# Patient Record
Sex: FEMALE | Race: OTHER | Employment: FULL TIME | URBAN - METROPOLITAN AREA
[De-identification: names, ages, dates, MRNs, and addresses within clinical notes are randomized per-mention and may not be internally consistent; named-entity substitution may affect disease eponyms.]

---

## 2018-01-02 PROCEDURE — 87591 N.GONORRHOEAE DNA AMP PROB: CPT | Performed by: NURSE PRACTITIONER

## 2018-01-02 PROCEDURE — 87491 CHLMYD TRACH DNA AMP PROBE: CPT | Performed by: NURSE PRACTITIONER

## 2018-01-02 PROCEDURE — 88175 CYTOPATH C/V AUTO FLUID REDO: CPT | Performed by: NURSE PRACTITIONER

## 2018-01-02 PROCEDURE — 87624 HPV HI-RISK TYP POOLED RSLT: CPT | Performed by: NURSE PRACTITIONER

## 2019-12-06 ENCOUNTER — HOSPITAL ENCOUNTER (EMERGENCY)
Facility: HOSPITAL | Age: 29
Discharge: HOME | End: 2019-12-07
Attending: EMERGENCY MEDICINE
Payer: COMMERCIAL

## 2019-12-06 DIAGNOSIS — O20.0 THREATENED ABORTION: Primary | ICD-10-CM

## 2019-12-06 PROCEDURE — 99284 EMERGENCY DEPT VISIT MOD MDM: CPT

## 2019-12-06 PROCEDURE — 99285 EMERGENCY DEPT VISIT HI MDM: CPT

## 2019-12-06 PROCEDURE — 96360 HYDRATION IV INFUSION INIT: CPT

## 2019-12-06 RX ORDER — SERTRALINE HYDROCHLORIDE 100 MG/1
100 TABLET, FILM COATED ORAL DAILY
COMMUNITY
End: 2020-03-18 | Stop reason: ALTCHOICE

## 2019-12-06 RX ORDER — PRENATAL VIT/IRON FUM/FOLIC AC 27 MG-1 MG
TABLET ORAL
COMMUNITY

## 2019-12-06 RX ORDER — ASPIRIN 81 MG/1
81 TABLET ORAL DAILY
COMMUNITY
End: 2019-12-18

## 2019-12-06 RX ORDER — PROGESTERONE 50 MG/ML
INJECTION, SOLUTION INTRAMUSCULAR DAILY
COMMUNITY
End: 2019-12-18

## 2019-12-06 RX ORDER — ESTRADIOL 2 MG/1
6 TABLET ORAL NIGHTLY
COMMUNITY
End: 2019-12-18

## 2019-12-07 ENCOUNTER — APPOINTMENT (OUTPATIENT)
Dept: ULTRASOUND IMAGING | Facility: HOSPITAL | Age: 29
End: 2019-12-07
Attending: EMERGENCY MEDICINE
Payer: COMMERCIAL

## 2019-12-07 VITALS
TEMPERATURE: 97 F | HEART RATE: 71 BPM | RESPIRATION RATE: 20 BRPM | DIASTOLIC BLOOD PRESSURE: 67 MMHG | HEIGHT: 66 IN | WEIGHT: 160 LBS | OXYGEN SATURATION: 98 % | BODY MASS INDEX: 25.71 KG/M2 | SYSTOLIC BLOOD PRESSURE: 109 MMHG

## 2019-12-07 PROCEDURE — 85025 COMPLETE CBC W/AUTO DIFF WBC: CPT | Performed by: EMERGENCY MEDICINE

## 2019-12-07 PROCEDURE — 76802 OB US < 14 WKS ADDL FETUS: CPT | Performed by: EMERGENCY MEDICINE

## 2019-12-07 PROCEDURE — 93975 VASCULAR STUDY: CPT | Performed by: EMERGENCY MEDICINE

## 2019-12-07 PROCEDURE — 76801 OB US < 14 WKS SINGLE FETUS: CPT | Performed by: EMERGENCY MEDICINE

## 2019-12-07 PROCEDURE — 84702 CHORIONIC GONADOTROPIN TEST: CPT | Performed by: EMERGENCY MEDICINE

## 2019-12-07 PROCEDURE — 86901 BLOOD TYPING SEROLOGIC RH(D): CPT | Performed by: EMERGENCY MEDICINE

## 2019-12-07 PROCEDURE — 76817 TRANSVAGINAL US OBSTETRIC: CPT | Performed by: EMERGENCY MEDICINE

## 2019-12-07 PROCEDURE — 86900 BLOOD TYPING SEROLOGIC ABO: CPT | Performed by: EMERGENCY MEDICINE

## 2019-12-07 NOTE — ED INITIAL ASSESSMENT (HPI)
29 yo F, , 8 weeks pregnant, IVF twins. Started vaginal bleeding, with blood clot, mucus coming out, and some mild abdomen cramping.

## 2019-12-07 NOTE — ED PROVIDER NOTES
Patient Seen in: [facility] Emergency Department      History   Patient presents with:  Pregnancy Issues    Stated Complaint: 8 weeks pregnant with twins, vaginal bleeding and minor cramping    HPI    43-year-old female with a history of depression, Regular rate and rhythm  Lungs: Clear to auscultation bilaterally. Abdomen: Soft, nondistended, nontender. Pelvic exam was deferred pending her ultrasound  Extremities: No evidence of deformity. No clubbing or cyanosis. Neuro: No focal deficit is noted. There is a third empty sac noted on the right. Small subchorionic hemorrhage. Cervix appears closed. Attended by Dr. Safia Woodall. Patient was brought back to the examination room immediately.   The patient was then placed on a cardiac monitor and p

## 2019-12-07 NOTE — ED NOTES
ED Provider - Dr. Arron Sanchez at bedside for re-evaluation and update. Pt for discharge. Awaits registration.

## 2019-12-12 ENCOUNTER — INITIAL PRENATAL (OUTPATIENT)
Dept: OBGYN CLINIC | Facility: CLINIC | Age: 29
End: 2019-12-12
Payer: COMMERCIAL

## 2019-12-12 VITALS
SYSTOLIC BLOOD PRESSURE: 116 MMHG | HEIGHT: 66 IN | BODY MASS INDEX: 25.71 KG/M2 | WEIGHT: 160 LBS | DIASTOLIC BLOOD PRESSURE: 52 MMHG

## 2019-12-12 DIAGNOSIS — O20.0 THREATENED ABORTION, ANTEPARTUM: ICD-10-CM

## 2019-12-12 DIAGNOSIS — O09.811 HIGH RISK PREGNANCY DUE TO ASSISTED REPRODUCTIVE TECHNOLOGY IN FIRST TRIMESTER: ICD-10-CM

## 2019-12-12 DIAGNOSIS — O09.291 HISTORY OF PRIOR PREGNANCY WITH SHORT CERVIX, CURRENTLY PREGNANT IN FIRST TRIMESTER: ICD-10-CM

## 2019-12-12 DIAGNOSIS — O99.341 MENTAL DISORDER AFFECTING PREGNANCY IN FIRST TRIMESTER: ICD-10-CM

## 2019-12-12 DIAGNOSIS — O09.291 H/O MACROSOMIA IN INFANT IN PRIOR PREGNANCY, CURRENTLY PREGNANT, FIRST TRIMESTER: ICD-10-CM

## 2019-12-12 DIAGNOSIS — Z12.4 PAPANICOLAOU SMEAR FOR CERVICAL CANCER SCREENING: ICD-10-CM

## 2019-12-12 DIAGNOSIS — O09.811 PREGNANCY RESULTING FROM ASSISTED REPRODUCTIVE TECHNOLOGY IN FIRST TRIMESTER: Primary | ICD-10-CM

## 2019-12-12 DIAGNOSIS — O34.219 PREGNANCY WITH HISTORY OF CESAREAN SECTION, ANTEPARTUM: ICD-10-CM

## 2019-12-12 DIAGNOSIS — O30.101 TRIPLET GESTATION IN FIRST TRIMESTER, UNSPECIFIED MULTIPLE GESTATION TYPE: ICD-10-CM

## 2019-12-12 PROCEDURE — 87086 URINE CULTURE/COLONY COUNT: CPT | Performed by: OBSTETRICS & GYNECOLOGY

## 2019-12-12 PROCEDURE — 87491 CHLMYD TRACH DNA AMP PROBE: CPT | Performed by: OBSTETRICS & GYNECOLOGY

## 2019-12-12 PROCEDURE — 88175 CYTOPATH C/V AUTO FLUID REDO: CPT | Performed by: OBSTETRICS & GYNECOLOGY

## 2019-12-12 PROCEDURE — 87591 N.GONORRHOEAE DNA AMP PROB: CPT | Performed by: OBSTETRICS & GYNECOLOGY

## 2019-12-12 PROCEDURE — 81002 URINALYSIS NONAUTO W/O SCOPE: CPT | Performed by: OBSTETRICS & GYNECOLOGY

## 2019-12-12 RX ORDER — FOLIC ACID 1 MG/1
1 TABLET ORAL DAILY
Qty: 90 TABLET | Refills: 5 | Status: SHIPPED | OUTPATIENT
Start: 2019-12-12 | End: 2020-07-13 | Stop reason: ALTCHOICE

## 2019-12-12 RX ORDER — DIPHENHYDRAMINE HYDROCHLORIDE 25 MG/1
25 CAPSULE ORAL DAILY
COMMUNITY
End: 2020-02-11

## 2019-12-12 RX ORDER — CABERGOLINE 0.5 MG/1
0.25 TABLET ORAL
COMMUNITY
End: 2020-01-03 | Stop reason: ALTCHOICE

## 2019-12-13 NOTE — PROGRESS NOTES
She had intercourse and went to the emergency room she had a very large clot about 6 cm did an ultrasound and showed 2 viable pregnancies and one empty sac. This pregnancy is IVF. Did not do PGD. Since then her bleeding has been decreased.   We will add

## 2019-12-16 ENCOUNTER — TELEPHONE (OUTPATIENT)
Dept: OBGYN CLINIC | Facility: CLINIC | Age: 29
End: 2019-12-16

## 2019-12-16 PROBLEM — O09.291 H/O MACROSOMIA IN INFANT IN PRIOR PREGNANCY, CURRENTLY PREGNANT, FIRST TRIMESTER: Status: ACTIVE | Noted: 2019-12-16

## 2019-12-16 PROBLEM — O09.811 HIGH RISK PREGNANCY DUE TO ASSISTED REPRODUCTIVE TECHNOLOGY IN FIRST TRIMESTER: Status: ACTIVE | Noted: 2019-12-12

## 2019-12-16 PROBLEM — O34.219 PREGNANCY WITH HISTORY OF CESAREAN SECTION, ANTEPARTUM: Status: ACTIVE | Noted: 2019-12-16

## 2019-12-16 PROBLEM — O20.0 THREATENED ABORTION, ANTEPARTUM: Status: ACTIVE | Noted: 2019-12-16

## 2019-12-16 PROBLEM — O09.291 HISTORY OF PRIOR PREGNANCY WITH SHORT CERVIX, CURRENTLY PREGNANT IN FIRST TRIMESTER: Status: ACTIVE | Noted: 2019-12-16

## 2019-12-16 PROBLEM — O30.101 TRIPLET GESTATION IN FIRST TRIMESTER: Status: ACTIVE | Noted: 2019-12-16

## 2019-12-16 PROBLEM — O99.341 MENTAL DISORDER AFFECTING PREGNANCY IN FIRST TRIMESTER: Status: ACTIVE | Noted: 2019-12-16

## 2019-12-18 ENCOUNTER — ULTRASOUND ENCOUNTER (OUTPATIENT)
Dept: OBGYN CLINIC | Facility: CLINIC | Age: 29
End: 2019-12-18
Payer: COMMERCIAL

## 2019-12-18 ENCOUNTER — ROUTINE PRENATAL (OUTPATIENT)
Dept: OBGYN CLINIC | Facility: CLINIC | Age: 29
End: 2019-12-18
Payer: COMMERCIAL

## 2019-12-18 VITALS
SYSTOLIC BLOOD PRESSURE: 116 MMHG | BODY MASS INDEX: 25.88 KG/M2 | WEIGHT: 161 LBS | DIASTOLIC BLOOD PRESSURE: 60 MMHG | HEIGHT: 66 IN

## 2019-12-18 DIAGNOSIS — Z34.81 PRENATAL CARE, SUBSEQUENT PREGNANCY, FIRST TRIMESTER: Primary | ICD-10-CM

## 2019-12-18 DIAGNOSIS — O09.811 HIGH RISK PREGNANCY DUE TO ASSISTED REPRODUCTIVE TECHNOLOGY IN FIRST TRIMESTER: ICD-10-CM

## 2019-12-18 DIAGNOSIS — O20.0 THREATENED MISCARRIAGE IN EARLY PREGNANCY: ICD-10-CM

## 2019-12-18 PROCEDURE — 81002 URINALYSIS NONAUTO W/O SCOPE: CPT | Performed by: OBSTETRICS & GYNECOLOGY

## 2019-12-18 NOTE — PROGRESS NOTES
She started bleeding again today like a. No clots. She feels pressure. She has a moderate amount of dark blood cervix is closed. We will send for an ultrasound.

## 2019-12-29 PROBLEM — O30.041 DICHORIONIC DIAMNIOTIC TWIN PREGNANCY IN FIRST TRIMESTER: Status: ACTIVE | Noted: 2019-12-29

## 2019-12-29 PROBLEM — O99.011 ANEMIA COMPLICATING PREGNANCY, FIRST TRIMESTER: Status: ACTIVE | Noted: 2019-12-29

## 2019-12-30 ENCOUNTER — TELEPHONE (OUTPATIENT)
Dept: OBGYN CLINIC | Facility: CLINIC | Age: 29
End: 2019-12-30

## 2019-12-30 NOTE — PROGRESS NOTES
Patient aware of results and recommendations to take iron and folic acid daily. Patient states that she has a hard time tolerating her PNV now but will take the additional meds when she can. Patient verbalizes understanding.

## 2019-12-30 NOTE — TELEPHONE ENCOUNTER
I called patient about her lab results and while talking to her she mentioned that she had another bleed just like the other two. She states that she did not call the doctor and is not having any bleeding now. She has O+ blood type.   Dr Bridgett bejarano

## 2020-01-03 ENCOUNTER — ROUTINE PRENATAL (OUTPATIENT)
Dept: OBGYN CLINIC | Facility: CLINIC | Age: 30
End: 2020-01-03
Payer: COMMERCIAL

## 2020-01-03 ENCOUNTER — ULTRASOUND ENCOUNTER (OUTPATIENT)
Dept: OBGYN CLINIC | Facility: CLINIC | Age: 30
End: 2020-01-03
Payer: COMMERCIAL

## 2020-01-03 VITALS
BODY MASS INDEX: 26.68 KG/M2 | HEIGHT: 66 IN | DIASTOLIC BLOOD PRESSURE: 60 MMHG | WEIGHT: 166 LBS | SYSTOLIC BLOOD PRESSURE: 104 MMHG

## 2020-01-03 DIAGNOSIS — Z34.92 ENCOUNTER FOR SUPERVISION OF NORMAL PREGNANCY IN SECOND TRIMESTER, UNSPECIFIED GRAVIDITY: Primary | ICD-10-CM

## 2020-01-03 LAB
GLUCOSE (URINE DIPSTICK): NEGATIVE MG/DL
MULTISTIX LOT#: NORMAL NUMERIC
PROTEIN (URINE DIPSTICK): NEGATIVE MG/DL

## 2020-01-03 PROCEDURE — 81002 URINALYSIS NONAUTO W/O SCOPE: CPT | Performed by: OBSTETRICS & GYNECOLOGY

## 2020-01-03 RX ORDER — ONDANSETRON HYDROCHLORIDE 8 MG/1
8 TABLET, FILM COATED ORAL EVERY 8 HOURS PRN
COMMUNITY

## 2020-01-08 ENCOUNTER — TELEPHONE (OUTPATIENT)
Dept: OBGYN CLINIC | Facility: CLINIC | Age: 30
End: 2020-01-08

## 2020-01-08 DIAGNOSIS — O09.291 HISTORY OF PRIOR PREGNANCY WITH SHORT CERVIX, CURRENTLY PREGNANT IN FIRST TRIMESTER: Primary | ICD-10-CM

## 2020-01-08 DIAGNOSIS — O09.811 HIGH RISK PREGNANCY DUE TO ASSISTED REPRODUCTIVE TECHNOLOGY IN FIRST TRIMESTER: ICD-10-CM

## 2020-01-08 DIAGNOSIS — O44.51: ICD-10-CM

## 2020-01-08 DIAGNOSIS — O30.041 DICHORIONIC DIAMNIOTIC TWIN PREGNANCY IN FIRST TRIMESTER: ICD-10-CM

## 2020-01-08 DIAGNOSIS — O20.0 THREATENED ABORTION, ANTEPARTUM: ICD-10-CM

## 2020-01-08 DIAGNOSIS — O99.341 MENTAL DISORDER AFFECTING PREGNANCY IN FIRST TRIMESTER: ICD-10-CM

## 2020-01-08 DIAGNOSIS — O30.101 TRIPLET GESTATION IN FIRST TRIMESTER, UNSPECIFIED MULTIPLE GESTATION TYPE: ICD-10-CM

## 2020-01-08 DIAGNOSIS — O99.011 ANEMIA COMPLICATING PREGNANCY, FIRST TRIMESTER: ICD-10-CM

## 2020-01-08 DIAGNOSIS — O34.219 PREGNANCY WITH HISTORY OF CESAREAN SECTION, ANTEPARTUM: ICD-10-CM

## 2020-01-08 DIAGNOSIS — O09.291 H/O MACROSOMIA IN INFANT IN PRIOR PREGNANCY, CURRENTLY PREGNANT, FIRST TRIMESTER: ICD-10-CM

## 2020-01-08 PROBLEM — O21.9 NAUSEA AND VOMITING DURING PREGNANCY PRIOR TO 22 WEEKS GESTATION: Status: ACTIVE | Noted: 2020-01-08

## 2020-01-08 NOTE — TELEPHONE ENCOUNTER
Resulted on patient's US from 1/3/2020 due to Dr. Oumou Cheung out of office.      1/3/2020  Indication: Vaginal bleeding, threatened  at 16 weeks gestation in Di-DI twin gestation  [address]

## 2020-01-08 NOTE — TELEPHONE ENCOUNTER
Patient aware of ultrasound results with viable twin gestation  Twin A has low-lying placenta. Recommendation for   Pelvic rest and Consult with MFM and US for 16 weeks to have a baseline transvaginal cervical length done.  Patients questions answered and v

## 2020-01-09 ENCOUNTER — ROUTINE PRENATAL (OUTPATIENT)
Dept: OBGYN CLINIC | Facility: CLINIC | Age: 30
End: 2020-01-09
Payer: COMMERCIAL

## 2020-01-09 ENCOUNTER — ULTRASOUND ENCOUNTER (OUTPATIENT)
Dept: OBGYN CLINIC | Facility: CLINIC | Age: 30
End: 2020-01-09
Payer: COMMERCIAL

## 2020-01-09 VITALS
WEIGHT: 164 LBS | DIASTOLIC BLOOD PRESSURE: 52 MMHG | HEIGHT: 66 IN | SYSTOLIC BLOOD PRESSURE: 102 MMHG | BODY MASS INDEX: 26.36 KG/M2 | HEART RATE: 80 BPM

## 2020-01-09 DIAGNOSIS — O09.291 HISTORY OF PRIOR PREGNANCY WITH SHORT CERVIX, CURRENTLY PREGNANT IN FIRST TRIMESTER: ICD-10-CM

## 2020-01-09 DIAGNOSIS — O44.20 MARGINAL PLACENTA PREVIA: ICD-10-CM

## 2020-01-09 DIAGNOSIS — O21.9 NAUSEA AND VOMITING DURING PREGNANCY PRIOR TO 22 WEEKS GESTATION: ICD-10-CM

## 2020-01-09 DIAGNOSIS — O09.811 HIGH RISK PREGNANCY DUE TO ASSISTED REPRODUCTIVE TECHNOLOGY IN FIRST TRIMESTER: ICD-10-CM

## 2020-01-09 DIAGNOSIS — O99.011 ANEMIA COMPLICATING PREGNANCY, FIRST TRIMESTER: ICD-10-CM

## 2020-01-09 DIAGNOSIS — O34.219 PREGNANCY WITH HISTORY OF CESAREAN SECTION, ANTEPARTUM: ICD-10-CM

## 2020-01-09 DIAGNOSIS — O09.291 H/O MACROSOMIA IN INFANT IN PRIOR PREGNANCY, CURRENTLY PREGNANT, FIRST TRIMESTER: ICD-10-CM

## 2020-01-09 DIAGNOSIS — Z3A.12 12 WEEKS GESTATION OF PREGNANCY: ICD-10-CM

## 2020-01-09 DIAGNOSIS — O20.0 THREATENED ABORTION, ANTEPARTUM: ICD-10-CM

## 2020-01-09 DIAGNOSIS — O30.041 DICHORIONIC DIAMNIOTIC TWIN PREGNANCY IN FIRST TRIMESTER: Primary | ICD-10-CM

## 2020-01-09 DIAGNOSIS — O99.341 MENTAL DISORDER AFFECTING PREGNANCY IN FIRST TRIMESTER: ICD-10-CM

## 2020-01-09 LAB
GLUCOSE (URINE DIPSTICK): NEGATIVE MG/DL
MULTISTIX LOT#: NORMAL NUMERIC

## 2020-01-09 PROCEDURE — 81002 URINALYSIS NONAUTO W/O SCOPE: CPT | Performed by: OBSTETRICS & GYNECOLOGY

## 2020-01-09 RX ORDER — ONDANSETRON 8 MG/1
TABLET, ORALLY DISINTEGRATING ORAL
COMMUNITY
Start: 2020-01-04 | End: 2020-01-09

## 2020-01-09 NOTE — PROGRESS NOTES
ADENIKE    Has had vaginal bleeding throughout the entire pregnancy off & on. The last bleed was 2 Saturdays ago. Was having intercourse up until this past Saturday. Didn't bleed after the IC. Some brown discharge. Some nausea & some gagging.  Taking Daniel

## 2020-01-09 NOTE — PROGRESS NOTES
Dr Karen Lagos discussed Sparkle Joiner results at visit on 1/9/20  with patient.  Recommendation for pelvic rest.

## 2020-01-15 LAB
AMB EXT CREATININE: 0.5 MG/DL
AMB EXT GFR: 156
AMB EXT GLUCOSE: 96 MG/DL
AMB EXT HEMATOCRIT: 36.4
AMB EXT HEMOGLOBIN: 11.5
AMB EXT MCV: 92
AMB EXT PLATELETS: 361
AMB EXT WBC: 14.2 X10(3)UL

## 2020-01-16 ENCOUNTER — TELEPHONE (OUTPATIENT)
Dept: OBGYN CLINIC | Facility: CLINIC | Age: 30
End: 2020-01-16

## 2020-01-16 ENCOUNTER — HOSPITAL ENCOUNTER (EMERGENCY)
Age: 30
Discharge: HOME | End: 2020-01-16
Attending: EMERGENCY MEDICINE
Payer: COMMERCIAL

## 2020-01-16 ENCOUNTER — APPOINTMENT (OUTPATIENT)
Dept: ULTRASOUND IMAGING | Age: 30
End: 2020-01-16
Attending: EMERGENCY MEDICINE
Payer: COMMERCIAL

## 2020-01-16 VITALS
SYSTOLIC BLOOD PRESSURE: 108 MMHG | HEART RATE: 81 BPM | BODY MASS INDEX: 26.36 KG/M2 | HEIGHT: 66 IN | OXYGEN SATURATION: 98 % | TEMPERATURE: 99 F | WEIGHT: 164 LBS | RESPIRATION RATE: 16 BRPM | DIASTOLIC BLOOD PRESSURE: 68 MMHG

## 2020-01-16 DIAGNOSIS — R42 DIZZINESS: Primary | ICD-10-CM

## 2020-01-16 DIAGNOSIS — E86.0 DEHYDRATION: ICD-10-CM

## 2020-01-16 LAB
ALBUMIN SERPL-MCNC: 3.3 G/DL (ref 3.4–5)
ALBUMIN/GLOB SERPL: 0.8 {RATIO} (ref 1–2)
ALP LIVER SERPL-CCNC: 57 U/L (ref 37–98)
ALT SERPL-CCNC: 13 U/L (ref 13–56)
ANION GAP SERPL CALC-SCNC: 6 MMOL/L (ref 0–18)
AST SERPL-CCNC: 15 U/L (ref 15–37)
BASOPHILS # BLD AUTO: 0.05 X10(3) UL (ref 0–0.2)
BASOPHILS NFR BLD AUTO: 0.3 %
BILIRUB SERPL-MCNC: 0.2 MG/DL (ref 0.1–2)
BILIRUB UR QL STRIP.AUTO: NEGATIVE
BUN BLD-MCNC: 7 MG/DL (ref 7–18)
BUN/CREAT SERPL: 13.2 (ref 10–20)
CALCIUM BLD-MCNC: 8.8 MG/DL (ref 8.5–10.1)
CHLORIDE SERPL-SCNC: 104 MMOL/L (ref 98–112)
CLARITY UR REFRACT.AUTO: CLEAR
CO2 SERPL-SCNC: 26 MMOL/L (ref 21–32)
COLOR UR AUTO: YELLOW
CREAT BLD-MCNC: 0.53 MG/DL (ref 0.55–1.02)
DEPRECATED RDW RBC AUTO: 42.5 FL (ref 35.1–46.3)
EOSINOPHIL # BLD AUTO: 0.67 X10(3) UL (ref 0–0.7)
EOSINOPHIL NFR BLD AUTO: 4.3 %
ERYTHROCYTE [DISTWIDTH] IN BLOOD BY AUTOMATED COUNT: 13.2 % (ref 11–15)
GLOBULIN PLAS-MCNC: 4.1 G/DL (ref 2.8–4.4)
GLUCOSE BLD-MCNC: 90 MG/DL (ref 70–99)
GLUCOSE UR STRIP.AUTO-MCNC: NEGATIVE MG/DL
HCT VFR BLD AUTO: 32 % (ref 35–48)
HGB BLD-MCNC: 11.1 G/DL (ref 12–16)
IMM GRANULOCYTES # BLD AUTO: 0.09 X10(3) UL (ref 0–1)
IMM GRANULOCYTES NFR BLD: 0.6 %
KETONES UR STRIP.AUTO-MCNC: NEGATIVE MG/DL
LYMPHOCYTES # BLD AUTO: 3.87 X10(3) UL (ref 1–4)
LYMPHOCYTES NFR BLD AUTO: 25.1 %
M PROTEIN MFR SERPL ELPH: 7.4 G/DL (ref 6.4–8.2)
MCH RBC QN AUTO: 30.4 PG (ref 26–34)
MCHC RBC AUTO-ENTMCNC: 34.7 G/DL (ref 31–37)
MCV RBC AUTO: 87.7 FL (ref 80–100)
MONOCYTES # BLD AUTO: 0.98 X10(3) UL (ref 0.1–1)
MONOCYTES NFR BLD AUTO: 6.4 %
NEUTROPHILS # BLD AUTO: 9.77 X10 (3) UL (ref 1.5–7.7)
NEUTROPHILS # BLD AUTO: 9.77 X10(3) UL (ref 1.5–7.7)
NEUTROPHILS NFR BLD AUTO: 63.3 %
NITRITE UR QL STRIP.AUTO: NEGATIVE
OSMOLALITY SERPL CALC.SUM OF ELEC: 280 MOSM/KG (ref 275–295)
PH UR STRIP.AUTO: 6.5 [PH] (ref 4.5–8)
PLATELET # BLD AUTO: 319 10(3)UL (ref 150–450)
POTASSIUM SERPL-SCNC: 4 MMOL/L (ref 3.5–5.1)
PROT UR STRIP.AUTO-MCNC: NEGATIVE MG/DL
RBC # BLD AUTO: 3.65 X10(6)UL (ref 3.8–5.3)
SODIUM SERPL-SCNC: 136 MMOL/L (ref 136–145)
SP GR UR STRIP.AUTO: 1.01 (ref 1–1.03)
UROBILINOGEN UR STRIP.AUTO-MCNC: 0.2 MG/DL
WBC # BLD AUTO: 15.4 X10(3) UL (ref 4–11)

## 2020-01-16 PROCEDURE — 99284 EMERGENCY DEPT VISIT MOD MDM: CPT

## 2020-01-16 PROCEDURE — 81001 URINALYSIS AUTO W/SCOPE: CPT | Performed by: EMERGENCY MEDICINE

## 2020-01-16 PROCEDURE — 76802 OB US < 14 WKS ADDL FETUS: CPT | Performed by: EMERGENCY MEDICINE

## 2020-01-16 PROCEDURE — 93005 ELECTROCARDIOGRAM TRACING: CPT

## 2020-01-16 PROCEDURE — 85025 COMPLETE CBC W/AUTO DIFF WBC: CPT | Performed by: EMERGENCY MEDICINE

## 2020-01-16 PROCEDURE — 80053 COMPREHEN METABOLIC PANEL: CPT | Performed by: EMERGENCY MEDICINE

## 2020-01-16 PROCEDURE — 76801 OB US < 14 WKS SINGLE FETUS: CPT | Performed by: EMERGENCY MEDICINE

## 2020-01-16 PROCEDURE — 93010 ELECTROCARDIOGRAM REPORT: CPT

## 2020-01-16 PROCEDURE — 99285 EMERGENCY DEPT VISIT HI MDM: CPT

## 2020-01-16 PROCEDURE — 96360 HYDRATION IV INFUSION INIT: CPT

## 2020-01-16 PROCEDURE — 76815 OB US LIMITED FETUS(S): CPT | Performed by: EMERGENCY MEDICINE

## 2020-01-16 NOTE — TELEPHONE ENCOUNTER
PC with patient. States she has not been feeling well since yesterday morning. Says her heart feels like it is racing sometimes and that her blood pressure is high. 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  Denies feeling

## 2020-01-16 NOTE — ED PROVIDER NOTES
Patient Seen in: [facility] Emergency Department In [place]      History   Patient presents with:  Dizziness: 14 weeks pregnant with twins, bp and pulse high     Stated Complaint:     HPI    50-year-old female presents for evaluation of dizziness.   Louie reviewed and negative except as noted above.     Physical Exam     ED Triage Vitals [01/16/20 1704]   /68   Pulse 104   Resp 16   Temp 98.8 °F (37.1 °C)   Temp src Temporal   SpO2 100 %   O2 Device None (Room air)       Current:/68   Pulse 81 -----------         ------                     CBC W/ DIFFERENTIAL[[id number]]          Abnormal            Final result                 Please view results for these tests on the individual orders.      EKG    Rate, intervals and axes of subchorionic hemorrhage is not seen on today's exam.  However continued follow-up is suggested.     Dictated by: Nancy Quan MD on 1/16/2020 at 18:51     Approved by: Nancy Quan MD on 1/16/2020 at 18:55          Patient told to increase fluids, slow posit

## 2020-01-16 NOTE — TELEPHONE ENCOUNTER
Per pt she is not feeling well since yesterday morning, she feels like her heart is racing, high blood pressure and just not well enough. She said she sent some labs results to us to be checked and nobody has called her yet. Please advise and call pt.  Than

## 2020-01-17 ENCOUNTER — TELEPHONE (OUTPATIENT)
Dept: OBGYN CLINIC | Facility: CLINIC | Age: 30
End: 2020-01-17

## 2020-01-17 LAB
ATRIAL RATE: 83 BPM
FERRITIN: 91
FOLATE (FOLIC ACID), SERUM: >20
IRON: 93
P AXIS: 69 DEGREES
P-R INTERVAL: 156 MS
Q-T INTERVAL: 380 MS
QRS DURATION: 82 MS
QTC CALCULATION (BEZET): 446 MS
R AXIS: 71 DEGREES
T AXIS: 61 DEGREES
VENTRICULAR RATE: 83 BPM

## 2020-01-17 NOTE — TELEPHONE ENCOUNTER
Spoke to patient as a follow up phone call to ER visit. She is awaiting urine culture results from PMD. She already schedule appointment with our office as a follow up.

## 2020-01-20 ENCOUNTER — TELEPHONE (OUTPATIENT)
Dept: OBGYN CLINIC | Facility: CLINIC | Age: 30
End: 2020-01-20

## 2020-01-20 NOTE — TELEPHONE ENCOUNTER
Pt called to verify if we receive her labs from her primary. pt was told yes we did receive the labs.

## 2020-01-20 NOTE — TELEPHONE ENCOUNTER
Patient states that she is not feeling well, does not thing is related to UTI. Patient informed that according to her urine culture results that were faxed to our office she does have UTI and needs to be treated with antibiotics.  Her PMD already send scrip

## 2020-01-23 ENCOUNTER — ROUTINE PRENATAL (OUTPATIENT)
Dept: OBGYN CLINIC | Facility: CLINIC | Age: 30
End: 2020-01-23
Payer: COMMERCIAL

## 2020-01-23 ENCOUNTER — OFFICE VISIT (OUTPATIENT)
Dept: HEMATOLOGY/ONCOLOGY | Facility: HOSPITAL | Age: 30
End: 2020-01-23
Attending: OBSTETRICS & GYNECOLOGY
Payer: COMMERCIAL

## 2020-01-23 VITALS
DIASTOLIC BLOOD PRESSURE: 58 MMHG | SYSTOLIC BLOOD PRESSURE: 106 MMHG | RESPIRATION RATE: 20 BRPM | HEART RATE: 80 BPM | OXYGEN SATURATION: 98 % | TEMPERATURE: 99 F

## 2020-01-23 VITALS
HEIGHT: 66 IN | SYSTOLIC BLOOD PRESSURE: 100 MMHG | BODY MASS INDEX: 26.2 KG/M2 | WEIGHT: 163 LBS | DIASTOLIC BLOOD PRESSURE: 62 MMHG

## 2020-01-23 DIAGNOSIS — O21.9 NAUSEA AND VOMITING DURING PREGNANCY PRIOR TO 22 WEEKS GESTATION: ICD-10-CM

## 2020-01-23 DIAGNOSIS — O23.42 UTI (URINARY TRACT INFECTION) DURING PREGNANCY, SECOND TRIMESTER: Primary | ICD-10-CM

## 2020-01-23 DIAGNOSIS — O23.42 UTI (URINARY TRACT INFECTION) DURING PREGNANCY, SECOND TRIMESTER: ICD-10-CM

## 2020-01-23 DIAGNOSIS — O30.042 DICHORIONIC DIAMNIOTIC TWIN PREGNANCY IN SECOND TRIMESTER: ICD-10-CM

## 2020-01-23 DIAGNOSIS — O44.20 MARGINAL PLACENTA PREVIA: ICD-10-CM

## 2020-01-23 DIAGNOSIS — O09.292 HISTORY OF PRIOR PREGNANCY WITH SHORT CERVIX, CURRENTLY PREGNANT IN SECOND TRIMESTER: ICD-10-CM

## 2020-01-23 DIAGNOSIS — E86.0 DEHYDRATION: Primary | ICD-10-CM

## 2020-01-23 DIAGNOSIS — O99.012 ANEMIA COMPLICATING PREGNANCY IN SECOND TRIMESTER: ICD-10-CM

## 2020-01-23 DIAGNOSIS — O09.812 HIGH RISK PREGNANCY DUE TO ASSISTED REPRODUCTIVE TECHNOLOGY IN SECOND TRIMESTER: ICD-10-CM

## 2020-01-23 DIAGNOSIS — O09.292 H/O MACROSOMIA IN INFANT IN PRIOR PREGNANCY, CURRENTLY PREGNANT, SECOND TRIMESTER: ICD-10-CM

## 2020-01-23 DIAGNOSIS — O34.219 PREGNANCY WITH HISTORY OF CESAREAN SECTION, ANTEPARTUM: ICD-10-CM

## 2020-01-23 DIAGNOSIS — O20.0 THREATENED ABORTION, ANTEPARTUM: ICD-10-CM

## 2020-01-23 DIAGNOSIS — O99.342 MENTAL DISORDER AFFECTING PREGNANCY IN SECOND TRIMESTER: ICD-10-CM

## 2020-01-23 LAB
MULTISTIX EXPIRATION DATE: NORMAL DATE
MULTISTIX LOT#: NORMAL NUMERIC

## 2020-01-23 PROCEDURE — 81002 URINALYSIS NONAUTO W/O SCOPE: CPT | Performed by: OBSTETRICS & GYNECOLOGY

## 2020-01-23 PROCEDURE — 96366 THER/PROPH/DIAG IV INF ADDON: CPT

## 2020-01-23 PROCEDURE — 96365 THER/PROPH/DIAG IV INF INIT: CPT

## 2020-01-23 PROCEDURE — 99214 OFFICE O/P EST MOD 30 MIN: CPT | Performed by: OBSTETRICS & GYNECOLOGY

## 2020-01-23 RX ORDER — NITROFURANTOIN 25; 75 MG/1; MG/1
100 CAPSULE ORAL NIGHTLY
Qty: 90 CAPSULE | Refills: 2 | Status: SHIPPED | OUTPATIENT
Start: 2020-01-23 | End: 2020-03-18

## 2020-01-23 RX ORDER — NITROFURANTOIN 25; 75 MG/1; MG/1
100 CAPSULE ORAL 2 TIMES DAILY
COMMUNITY
Start: 2020-01-20 | End: 2020-01-27

## 2020-01-23 NOTE — PROGRESS NOTES
PROBLEM VISIT IN PREGNANCY    Chief complaint: Patient presents with:   Other: ob visit not feeling well F/U ER  Prenatal Care    Subjective:     HPI: Carl Nowak is a 30year old  at 14w5d f/u ED visit for dehydration on 2020    Initial pren Multiple0  Live Births1   OB History    Para Term  AB Living   2 1 1     1   SAB TAB Ectopic Multiple Live Births           1      # Outcome Date GA Lbr Kwaku/2nd Weight Sex Delivery Anes PTL Lv   2 Current            1 Term 08 41w0d  9 l History    Socioeconomic History      Marital status: Single      Spouse name: Not on file      Number of children: Not on file      Years of education: Not on file      Highest education level: Not on file    Occupational History      Not on file    Marine Problems Mother    • No Known Problems Daughter    • No Known Problems Maternal Grandmother    • No Known Problems Paternal Grandfather    • Breast Cancer Maternal Aunt 41       Objective:      01/23/20  0942   BP: 100/62   Weight: 163 lb (73.9 kg)   Saúl %      % 63.3   Lymphocytes %      % 25.1   Monocytes %      % 6.4   Eosinophils %      % 4.3   Basophils %      % 0.3   Immature Granulocyte %      % 0.6   Glucose      70 - 99 mg/dL 90   Sodium      136 - 145 mmol/L 136   Potassium      3.5 - 5.1 mmol/L Continued follow-up is recommended. Previously described area of subchorionic hemorrhage is not seen on today's exam.  However continued follow-up is suggested.     Dictated by: Catherine Cranker, MD on 1/16/2020 at 18:51     Approved by: Catherine Cranker, MD on 1/16/202 trimester    Dichorionic diamniotic twin pregnancy in second trimester    Anemia complicating pregnancy in second trimester    Marginal placenta previa    Nausea and vomiting during pregnancy prior to 22 weeks gestation  -     Cancel: 0.9 % NaCl 1000ml wit Discussed possible  abstinence syndrome.      RTC approx 2020 per routine OB visit schedule.

## 2020-01-23 NOTE — PATIENT INSTRUCTIONS
PELVIC REST    SCHEDULE APPOINTMENT WITH [facility] (MATERNAL FETAL MEDICINE aka \"MFM\"). Referral was placed on 1/8/2020. You are supposed to see them at 16 weeks to start cervical length monitoring. Tiara Joshi - Imtiaz Hare,

## 2020-01-23 NOTE — PROGRESS NOTES
Education Record    Learner:  Patient    Disease / Diagnosis: Dehydration - N/V    Barriers / Limitations:  None   Comments:    Method:  Discussion   Comments:    General Topics:  Medication, Procedure and Plan of care reviewed   Comments:    Outcome:  Rica

## 2020-01-24 ENCOUNTER — TELEPHONE (OUTPATIENT)
Dept: OBGYN CLINIC | Facility: CLINIC | Age: 30
End: 2020-01-24

## 2020-01-24 NOTE — TELEPHONE ENCOUNTER
Patient calling with c/o urinary frequency, bladder pressure and no improvement in symptoms since she has been diagnosed with UTI on 1/20/2020. Patient has been on Macrobid and received IV hydration on 1/23/2020.  Per Dr. Celso Duque recommendations patient i

## 2020-01-27 LAB
AMB EXT CREATININE: 0.5 MG/DL
AMB EXT GFR: 156
AMB EXT GLUCOSE: 100 MG/DL
AMB EXT HEMATOCRIT: 33.9
AMB EXT HEMOGLOBIN: 11.2
AMB EXT MCV: 91
AMB EXT PLATELETS: 350
AMB EXT WBC: 13.8 X10(3)UL

## 2020-02-06 ENCOUNTER — OFFICE VISIT (OUTPATIENT)
Dept: PERINATAL CARE | Facility: HOSPITAL | Age: 30
End: 2020-02-06
Attending: OBSTETRICS & GYNECOLOGY
Payer: COMMERCIAL

## 2020-02-06 VITALS
HEIGHT: 66 IN | WEIGHT: 165 LBS | SYSTOLIC BLOOD PRESSURE: 108 MMHG | HEART RATE: 88 BPM | DIASTOLIC BLOOD PRESSURE: 71 MMHG | BODY MASS INDEX: 26.52 KG/M2

## 2020-02-06 DIAGNOSIS — O09.291 HISTORY OF PRIOR PREGNANCY WITH SHORT CERVIX, CURRENTLY PREGNANT IN FIRST TRIMESTER: ICD-10-CM

## 2020-02-06 DIAGNOSIS — O30.041 DICHORIONIC DIAMNIOTIC TWIN PREGNANCY IN FIRST TRIMESTER: ICD-10-CM

## 2020-02-06 DIAGNOSIS — O34.219 PREGNANCY WITH HISTORY OF CESAREAN SECTION, ANTEPARTUM: ICD-10-CM

## 2020-02-06 DIAGNOSIS — O44.51: ICD-10-CM

## 2020-02-06 DIAGNOSIS — O30.042 DICHORIONIC DIAMNIOTIC TWIN PREGNANCY IN SECOND TRIMESTER: ICD-10-CM

## 2020-02-06 PROCEDURE — 76815 OB US LIMITED FETUS(S): CPT | Performed by: OBSTETRICS & GYNECOLOGY

## 2020-02-06 PROCEDURE — 76817 TRANSVAGINAL US OBSTETRIC: CPT | Performed by: OBSTETRICS & GYNECOLOGY

## 2020-02-06 PROCEDURE — 99244 OFF/OP CNSLTJ NEW/EST MOD 40: CPT | Performed by: OBSTETRICS & GYNECOLOGY

## 2020-02-06 NOTE — PROGRESS NOTES
Indication: di di twins, anemia, mental disorder, low lying placenta, hx short cx.   ____________________________________________________________________________  History: Age: 29 years. : 2 Para: 1.  Last menstrual period: 10/12/2019.  ___________ common. In one study of 549 twin pregnancies, an initial ultrasound examination was performed 3.5 to 4.5 weeks after ovulation and repeated every two weeks until 12 weeks of gestation.  Spontaneous reduction of one sac (vanishing twin) occurred in 32 percen 2.5 to 5.0 percent. In a retrospective study of 80 pregnancies, the incidence of single fetal death was much higher in monochorionic than diamniotic twins/triplets (26 versus 2.4 percent).                      First-time parents rarely appreciate the intens Posterior placenta. The limitations of ultrasound were discussed. Transvaginal US:    Transvaginal US was performed and the cervical length is   5.5 cm without funneling. The placenta is posterior and 1.5cm from the internal os.    The subchorion lying placenta    Recommendations:    1. Weekly NST at  32  wks     2. Level 2 US at 20wks with CL   3.  monthly growth US at 26wks  4. fetal echocardiogram at 24wks              Thank you for allowing me to participate in the care of your patient.   Ple

## 2020-02-11 ENCOUNTER — ROUTINE PRENATAL (OUTPATIENT)
Dept: OBGYN CLINIC | Facility: CLINIC | Age: 30
End: 2020-02-11
Payer: COMMERCIAL

## 2020-02-11 ENCOUNTER — TELEPHONE (OUTPATIENT)
Dept: OBGYN CLINIC | Facility: CLINIC | Age: 30
End: 2020-02-11

## 2020-02-11 VITALS
DIASTOLIC BLOOD PRESSURE: 62 MMHG | WEIGHT: 171 LBS | SYSTOLIC BLOOD PRESSURE: 104 MMHG | HEIGHT: 66 IN | BODY MASS INDEX: 27.48 KG/M2

## 2020-02-11 DIAGNOSIS — Z34.82 PRENATAL CARE, SUBSEQUENT PREGNANCY, SECOND TRIMESTER: Primary | ICD-10-CM

## 2020-02-11 LAB — MULTISTIX LOT#: NORMAL NUMERIC

## 2020-02-11 PROCEDURE — 81002 URINALYSIS NONAUTO W/O SCOPE: CPT | Performed by: OBSTETRICS & GYNECOLOGY

## 2020-02-11 NOTE — TELEPHONE ENCOUNTER
17w 3d twin pregnancy, spotting. Instructed patient to come to the office and be seen by Dr. Za Bravo. Appointment scheduled at 4:30pm. Okay with Dr. Za Bravo.

## 2020-02-11 NOTE — TELEPHONE ENCOUNTER
Per pt she just started spotting just a little bit. No cramping, no pain. Please advise and call pt.  Thanks

## 2020-02-11 NOTE — PROGRESS NOTES
2 weeks ago she had some bleeding today she had some pink bleeding she was seen by maternal-fetal medicine and told her that she had to call if she had bleeding she is not having any pain no dysuria no recent intercourse no recent exercise has not taken th

## 2020-02-27 ENCOUNTER — ROUTINE PRENATAL (OUTPATIENT)
Dept: OBGYN CLINIC | Facility: CLINIC | Age: 30
End: 2020-02-27
Payer: COMMERCIAL

## 2020-02-27 VITALS
BODY MASS INDEX: 27.97 KG/M2 | DIASTOLIC BLOOD PRESSURE: 60 MMHG | WEIGHT: 174 LBS | HEART RATE: 80 BPM | HEIGHT: 66 IN | SYSTOLIC BLOOD PRESSURE: 116 MMHG

## 2020-02-27 DIAGNOSIS — O34.219 PREGNANCY WITH HISTORY OF CESAREAN SECTION, ANTEPARTUM: ICD-10-CM

## 2020-02-27 DIAGNOSIS — O30.042 DICHORIONIC DIAMNIOTIC TWIN PREGNANCY IN SECOND TRIMESTER: ICD-10-CM

## 2020-02-27 DIAGNOSIS — O09.812 HIGH RISK PREGNANCY DUE TO ASSISTED REPRODUCTIVE TECHNOLOGY IN SECOND TRIMESTER: Primary | ICD-10-CM

## 2020-02-27 DIAGNOSIS — O99.012 ANEMIA COMPLICATING PREGNANCY IN SECOND TRIMESTER: ICD-10-CM

## 2020-02-27 DIAGNOSIS — O09.292 HISTORY OF PRIOR PREGNANCY WITH SHORT CERVIX, CURRENTLY PREGNANT IN SECOND TRIMESTER: ICD-10-CM

## 2020-02-27 DIAGNOSIS — O09.292 H/O MACROSOMIA IN INFANT IN PRIOR PREGNANCY, CURRENTLY PREGNANT, SECOND TRIMESTER: ICD-10-CM

## 2020-02-27 DIAGNOSIS — Z36.89 ENCOUNTER FOR OTHER SPECIFIED ANTENATAL SCREENING: ICD-10-CM

## 2020-02-27 DIAGNOSIS — O99.342 MENTAL DISORDER AFFECTING PREGNANCY IN SECOND TRIMESTER: ICD-10-CM

## 2020-02-27 LAB
AMB EXT AFP: NEGATIVE
BILIRUB UR QL STRIP.AUTO: NEGATIVE
CLARITY UR REFRACT.AUTO: CLEAR
COLOR UR AUTO: YELLOW
GLUCOSE (URINE DIPSTICK): NEGATIVE MG/DL
GLUCOSE UR STRIP.AUTO-MCNC: NEGATIVE MG/DL
KETONES UR STRIP.AUTO-MCNC: NEGATIVE MG/DL
LEUKOCYTE ESTERASE UR QL STRIP.AUTO: NEGATIVE
MULTISTIX LOT#: NORMAL NUMERIC
NITRITE UR QL STRIP.AUTO: NEGATIVE
PH UR STRIP.AUTO: 7 [PH] (ref 4.5–8)
PROT UR STRIP.AUTO-MCNC: NEGATIVE MG/DL
PROTEIN (URINE DIPSTICK): NEGATIVE MG/DL
SP GR UR STRIP.AUTO: 1.02 (ref 1–1.03)
UROBILINOGEN UR STRIP.AUTO-MCNC: <2 MG/DL

## 2020-02-27 PROCEDURE — 81001 URINALYSIS AUTO W/SCOPE: CPT | Performed by: OBSTETRICS & GYNECOLOGY

## 2020-02-27 PROCEDURE — 81002 URINALYSIS NONAUTO W/O SCOPE: CPT | Performed by: OBSTETRICS & GYNECOLOGY

## 2020-02-27 PROCEDURE — 87086 URINE CULTURE/COLONY COUNT: CPT | Performed by: OBSTETRICS & GYNECOLOGY

## 2020-02-27 NOTE — PATIENT INSTRUCTIONS
Plan 1 hr glucose test & CBC at 24-28 wk (3/28/2020 or later)     Please perform 24 hr urine collection for protein.  Need to get jugs & instructions from the lab at the hospital.

## 2020-02-27 NOTE — PROGRESS NOTES
ADENIKE    +FM x 2   Last vaginal bleeding about 2 weeks ago. Nothing since. No sex. No cramping  Does feel some mild tightenings but nothing bad.     No leaking fluid     30year old  at 19w5d   CLIFF 20 by IVF  O+  Aneuploidy & carrier screening -

## 2020-03-03 NOTE — PROGRESS NOTES
Patient aware of results and recommendations to continue Macrobid for suppression/ prophylaxis. Patient verbalizes understanding.

## 2020-03-04 NOTE — PROGRESS NOTES
Oj Banegas    Dear Dr. Luis Parry    Thank you for requesting ultrasound evaluation and maternal fetal medicine consultation on your patient Cirilo Turpin.   As you are aware she is a 30year old female Tay Newberry with a twin Ratio 5.852  >95th%  EFW (lbs/oz) 0 lbs 14 ozs  EFW (g) 398 g  n/a     Fetal heart activity: present. Fetal heart rate: 136 bpm.   Fetal presentation: cephalic. Amniotic fluid: normal. MVP 5.3 cm. Cord: normal.   Placenta: posterior.      Fetal Anatomy: suboptimal  Heart: Visualized and normal appearance: four-chamber view, left outflow tract, right outflow tract, ventricles, great vessels, Ductal arch, inferior vena cava, superior vena cava.    Aortic arch: suboptimal.  Genitalia: normal.    Summary of Ul RESOLVED     RECOMMENDATIONS:  · Continue care with Dr. Blackburn Fail   · Fetal echocardiogram at 24 weeks  Continue monthly growth ultrasounds. Weekly NST's at 32 weeks.   Monitor for signs or symptoms of  labor, preeclampsia or fetal growth disturbanc

## 2020-03-05 ENCOUNTER — OFFICE VISIT (OUTPATIENT)
Dept: PERINATAL CARE | Facility: HOSPITAL | Age: 30
End: 2020-03-05
Attending: OBSTETRICS & GYNECOLOGY
Payer: COMMERCIAL

## 2020-03-05 VITALS
SYSTOLIC BLOOD PRESSURE: 104 MMHG | WEIGHT: 174 LBS | BODY MASS INDEX: 28 KG/M2 | DIASTOLIC BLOOD PRESSURE: 66 MMHG | HEART RATE: 85 BPM

## 2020-03-05 DIAGNOSIS — O30.042 DICHORIONIC DIAMNIOTIC TWIN PREGNANCY IN SECOND TRIMESTER: ICD-10-CM

## 2020-03-05 DIAGNOSIS — Z03.72 PLACENTAL PROBLEM SUSPECTED BUT NOT FOUND: ICD-10-CM

## 2020-03-05 DIAGNOSIS — O09.812 HIGH RISK PREGNANCY DUE TO ASSISTED REPRODUCTIVE TECHNOLOGY IN SECOND TRIMESTER: ICD-10-CM

## 2020-03-05 DIAGNOSIS — Z03.75 SUSPECTED SHORTENING OF CERVIX NOT FOUND: ICD-10-CM

## 2020-03-05 PROCEDURE — 76811 OB US DETAILED SNGL FETUS: CPT | Performed by: OBSTETRICS & GYNECOLOGY

## 2020-03-05 PROCEDURE — 76812 OB US DETAILED ADDL FETUS: CPT | Performed by: OBSTETRICS & GYNECOLOGY

## 2020-03-05 PROCEDURE — 99215 OFFICE O/P EST HI 40 MIN: CPT | Performed by: OBSTETRICS & GYNECOLOGY

## 2020-03-05 PROCEDURE — 76817 TRANSVAGINAL US OBSTETRIC: CPT | Performed by: OBSTETRICS & GYNECOLOGY

## 2020-03-06 NOTE — PROGRESS NOTES
Screen Negative for open NTD for twins tested on 02/27/20 ( [facility] )  )

## 2020-03-18 ENCOUNTER — ROUTINE PRENATAL (OUTPATIENT)
Dept: OBGYN CLINIC | Facility: CLINIC | Age: 30
End: 2020-03-18
Payer: COMMERCIAL

## 2020-03-18 DIAGNOSIS — O34.219 PREGNANCY WITH HISTORY OF CESAREAN SECTION, ANTEPARTUM: ICD-10-CM

## 2020-03-18 DIAGNOSIS — O21.9 NAUSEA AND VOMITING DURING PREGNANCY PRIOR TO 22 WEEKS GESTATION: ICD-10-CM

## 2020-03-18 DIAGNOSIS — O30.042 DICHORIONIC DIAMNIOTIC TWIN PREGNANCY IN SECOND TRIMESTER: ICD-10-CM

## 2020-03-18 DIAGNOSIS — O23.42 UTI (URINARY TRACT INFECTION) DURING PREGNANCY, SECOND TRIMESTER: ICD-10-CM

## 2020-03-18 DIAGNOSIS — O26.892 HEARTBURN DURING PREGNANCY IN SECOND TRIMESTER: ICD-10-CM

## 2020-03-18 DIAGNOSIS — O99.342 MENTAL DISORDER AFFECTING PREGNANCY IN SECOND TRIMESTER: ICD-10-CM

## 2020-03-18 DIAGNOSIS — R12 HEARTBURN DURING PREGNANCY IN SECOND TRIMESTER: ICD-10-CM

## 2020-03-18 DIAGNOSIS — O09.292 HISTORY OF PRIOR PREGNANCY WITH SHORT CERVIX, CURRENTLY PREGNANT IN SECOND TRIMESTER: ICD-10-CM

## 2020-03-18 DIAGNOSIS — O99.012 ANEMIA COMPLICATING PREGNANCY IN SECOND TRIMESTER: ICD-10-CM

## 2020-03-18 DIAGNOSIS — O09.292 H/O MACROSOMIA IN INFANT IN PRIOR PREGNANCY, CURRENTLY PREGNANT, SECOND TRIMESTER: ICD-10-CM

## 2020-03-18 DIAGNOSIS — O09.812 HIGH RISK PREGNANCY DUE TO ASSISTED REPRODUCTIVE TECHNOLOGY IN SECOND TRIMESTER: Primary | ICD-10-CM

## 2020-03-18 PROBLEM — O44.20 MARGINAL PLACENTA PREVIA: Status: RESOLVED | Noted: 2020-01-09 | Resolved: 2020-03-18

## 2020-03-18 PROBLEM — O20.0 THREATENED ABORTION, ANTEPARTUM: Status: RESOLVED | Noted: 2019-12-16 | Resolved: 2020-03-18

## 2020-03-18 PROCEDURE — 81002 URINALYSIS NONAUTO W/O SCOPE: CPT | Performed by: OBSTETRICS & GYNECOLOGY

## 2020-03-18 PROCEDURE — 99214 OFFICE O/P EST MOD 30 MIN: CPT | Performed by: OBSTETRICS & GYNECOLOGY

## 2020-03-18 RX ORDER — FAMOTIDINE 20 MG/1
20 TABLET ORAL 2 TIMES DAILY
Qty: 180 TABLET | Refills: 3 | Status: ON HOLD | OUTPATIENT
Start: 2020-03-18 | End: 2020-05-14

## 2020-03-18 RX ORDER — NITROFURANTOIN 25; 75 MG/1; MG/1
100 CAPSULE ORAL NIGHTLY
Qty: 90 CAPSULE | Refills: 2 | Status: ON HOLD | COMMUNITY
Start: 2020-03-18 | End: 2020-06-05

## 2020-03-18 RX ORDER — FLUOXETINE HYDROCHLORIDE 40 MG/1
40 CAPSULE ORAL DAILY
Qty: 90 CAPSULE | Refills: 3 | OUTPATIENT
Start: 2020-03-18

## 2020-03-18 NOTE — PROGRESS NOTES
PROBLEM VISIT DURING PREGNANCY    CC: Wants to change anxiety medication. 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  Dizziness, nausea History:   Diagnosis Date   • Anxiety and depression     sertraline   • History of delivery of macrosomal infant 2008    9 lb 15 oz. Denies gestational diabetes. Reports \"on bedrest\" for short cervix.     • Hyperprolactinemia ([facility] [address] 02/2019   • Infertility and atraumatic. Eyes: Conjunctivae and EOM are normal.   Cardiovascular:    Edema not present. Abdominal: Soft. Gravid, nontender. FHT A 140, FHT B 135   Neurological: She is alert and oriented to person, place, and time. No cranial nerve deficit.    Sue Grandchild 3/5/2020:   Twin A normal anatomy, posterior placenta   Twin B normal anatomy, posterior placenta   Discordance 5.8%    TVCL 45 mm   -baseline 24 hr urine protein recommended per MFM - patient says she did the 24 hr urine protein collection through her PCP syndrome     1 hr GTT & CBC ordered & discussed. RTC 4 wk.

## 2020-03-19 VITALS
WEIGHT: 179.63 LBS | SYSTOLIC BLOOD PRESSURE: 100 MMHG | HEART RATE: 78 BPM | BODY MASS INDEX: 28.87 KG/M2 | DIASTOLIC BLOOD PRESSURE: 62 MMHG | HEIGHT: 66 IN

## 2020-03-23 ENCOUNTER — TELEPHONE (OUTPATIENT)
Dept: OBGYN CLINIC | Facility: CLINIC | Age: 30
End: 2020-03-23

## 2020-03-23 NOTE — TELEPHONE ENCOUNTER
Need a letter for the dentist.need clearance & also letter need to stat it's ok for local anesthesia. please fax letter to the dentist @[phone].

## 2020-03-24 LAB
Lab: 0.15
PROTEIN, URINE: 7
TOTAL URINE VOLUME: 2100

## 2020-03-31 NOTE — PROGRESS NOTES
Jeanna Thapa  Dear Dr. Chani Ceballos     Thank you for requesting ultrasound evaluation and maternal fetal medicine consultation on your patient Garret Conner.   As you are aware she is a 30year old female  with a twi activity: present. Fetal heart rate: 129 bpm.   Fetal presentation: breech. Amniotic fluid: normal. MVP 6.0 cm. Placenta: posterior. Fetal Anatomy:  Visualized with normal appearance: 4 chamber heart and great vessels, bladder.     Brain: Visualized valve:   AV Peak velocity:  110 cm/s    Fetus 2:  : Daniel Thomason M.D., FACOG    Image quality:  Good  Situs:  Normal  Position of stomach:  Left sided  Heart size:  Normal  Position of apex:  normal  Systemic veins:  Normal  Pulmonary veins:  Nor care of your patient. Please do not hesitate to contact me if additional questions or concerns arise.       Solange Wilson. Rios Acuña M.D.     The majority of the time (>50%) was spent in review of records, consultation and coordination of care.   Our discussion is

## 2020-04-03 ENCOUNTER — OFFICE VISIT (OUTPATIENT)
Dept: PERINATAL CARE | Facility: HOSPITAL | Age: 30
End: 2020-04-03
Attending: OBSTETRICS & GYNECOLOGY
Payer: COMMERCIAL

## 2020-04-03 VITALS
WEIGHT: 179 LBS | DIASTOLIC BLOOD PRESSURE: 71 MMHG | SYSTOLIC BLOOD PRESSURE: 121 MMHG | BODY MASS INDEX: 29 KG/M2 | HEART RATE: 91 BPM

## 2020-04-03 DIAGNOSIS — O09.812 HIGH RISK PREGNANCY DUE TO ASSISTED REPRODUCTIVE TECHNOLOGY IN SECOND TRIMESTER: ICD-10-CM

## 2020-04-03 DIAGNOSIS — O30.042 DICHORIONIC DIAMNIOTIC TWIN PREGNANCY IN SECOND TRIMESTER: ICD-10-CM

## 2020-04-03 PROCEDURE — 93325 DOPPLER ECHO COLOR FLOW MAPG: CPT | Performed by: OBSTETRICS & GYNECOLOGY

## 2020-04-03 PROCEDURE — 76827 ECHO EXAM OF FETAL HEART: CPT | Performed by: OBSTETRICS & GYNECOLOGY

## 2020-04-03 PROCEDURE — 76825 ECHO EXAM OF FETAL HEART: CPT | Performed by: OBSTETRICS & GYNECOLOGY

## 2020-04-03 PROCEDURE — 99215 OFFICE O/P EST HI 40 MIN: CPT | Performed by: OBSTETRICS & GYNECOLOGY

## 2020-04-03 PROCEDURE — 76816 OB US FOLLOW-UP PER FETUS: CPT | Performed by: OBSTETRICS & GYNECOLOGY

## 2020-04-15 ENCOUNTER — OFFICE VISIT (OUTPATIENT)
Dept: PERINATAL CARE | Facility: HOSPITAL | Age: 30
End: 2020-04-15
Attending: PEDIATRICS
Payer: COMMERCIAL

## 2020-04-15 PROCEDURE — 76827 ECHO EXAM OF FETAL HEART: CPT

## 2020-04-15 PROCEDURE — 93325 DOPPLER ECHO COLOR FLOW MAPG: CPT

## 2020-04-15 PROCEDURE — 76825 ECHO EXAM OF FETAL HEART: CPT

## 2020-04-15 NOTE — PROGRESS NOTES
CARDIOLOGY CONSULTATION :    Dear Willis Bailey     Thank you for requesting fetal echocardiogram and  cardiology  consultation on your patient Tyrell Orellana.  As you are aware she is a 30year old female  with a twin pregnancy a effusion. The fetal heart rates were normal with one to one conduction.  The ductus venous trajectory and Doppler flows were normal.   Umbilical arterial and umbilical venous Doppler signals were normal.     FETAL ECHOCARDIOGRAM  FOR FETUS B:    A limited t II US  · Overall unremarkable fetal echocardiograms other than wide separation of the second and third aortic arch branches on fetus A.   This is a soft finding for a possible  coarctation of the aorta.      RECOMMENDATIONS:  · Continue care with D

## 2020-04-16 ENCOUNTER — ROUTINE PRENATAL (OUTPATIENT)
Dept: OBGYN CLINIC | Facility: CLINIC | Age: 30
End: 2020-04-16
Payer: COMMERCIAL

## 2020-04-16 VITALS
DIASTOLIC BLOOD PRESSURE: 60 MMHG | HEIGHT: 66 IN | SYSTOLIC BLOOD PRESSURE: 100 MMHG | WEIGHT: 186 LBS | BODY MASS INDEX: 29.89 KG/M2

## 2020-04-16 DIAGNOSIS — Z3A.26 26 WEEKS GESTATION OF PREGNANCY: ICD-10-CM

## 2020-04-16 DIAGNOSIS — Z34.82 PRENATAL CARE, SUBSEQUENT PREGNANCY IN SECOND TRIMESTER: Primary | ICD-10-CM

## 2020-04-16 DIAGNOSIS — O30.042 DICHORIONIC DIAMNIOTIC TWIN PREGNANCY IN SECOND TRIMESTER: ICD-10-CM

## 2020-04-16 PROCEDURE — 81002 URINALYSIS NONAUTO W/O SCOPE: CPT | Performed by: OBSTETRICS & GYNECOLOGY

## 2020-04-16 RX ORDER — ALBUTEROL SULFATE 2.5 MG/3ML
SOLUTION RESPIRATORY (INHALATION)
COMMUNITY
Start: 2020-04-14

## 2020-04-16 RX ORDER — ALPRAZOLAM 0.5 MG/1
1 TABLET ORAL
Status: ON HOLD | COMMUNITY
End: 2020-05-14

## 2020-04-16 NOTE — PROGRESS NOTES
Patient feels occasional contractions  1. Di/Di twins  - u/s with MFM in 2 weeks and every 4 weeks  - NST at 36 weeks  2. IVF  - Fetal ECHO normal ,except wide separation of aortic branches in twin A, repeat after birth  3.  H/o C/S  - repeat at 38 weeks

## 2020-04-27 DIAGNOSIS — R73.09 ELEVATED GLUCOSE LEVEL: Primary | ICD-10-CM

## 2020-04-27 DIAGNOSIS — O99.013 ANEMIA DURING PREGNANCY IN THIRD TRIMESTER: ICD-10-CM

## 2020-04-27 NOTE — PROGRESS NOTES
Patient aware of results and recommendations for 3 hrs glucose, iron daily and ferritin level. Orders placed.   Patient verbalizes understanding

## 2020-04-28 ENCOUNTER — TELEPHONE (OUTPATIENT)
Dept: OBGYN CLINIC | Facility: CLINIC | Age: 30
End: 2020-04-28

## 2020-04-28 NOTE — TELEPHONE ENCOUNTER
----- Message from Benjamín Johnson DO sent at 4/16/2020 10:53 AM CDT -----  Please schedule RCS at 38 weeks, twins

## 2020-04-29 PROBLEM — O09.813 HIGH RISK PREGNANCY DUE TO ASSISTED REPRODUCTIVE TECHNOLOGY IN THIRD TRIMESTER: Status: ACTIVE | Noted: 2019-12-12

## 2020-04-29 PROBLEM — O30.101 TRIPLET GESTATION IN FIRST TRIMESTER: Status: RESOLVED | Noted: 2019-12-16 | Resolved: 2020-04-29

## 2020-04-29 PROBLEM — O26.893 HEARTBURN DURING PREGNANCY IN THIRD TRIMESTER: Status: ACTIVE | Noted: 2020-03-18

## 2020-04-29 PROBLEM — O30.043 DICHORIONIC DIAMNIOTIC TWIN PREGNANCY IN THIRD TRIMESTER: Status: ACTIVE | Noted: 2019-12-29

## 2020-04-29 PROBLEM — O99.013 ANEMIA COMPLICATING PREGNANCY IN THIRD TRIMESTER: Status: ACTIVE | Noted: 2019-12-29

## 2020-04-29 PROBLEM — O99.343 MENTAL DISORDER AFFECTING PREGNANCY IN THIRD TRIMESTER: Status: ACTIVE | Noted: 2019-12-16

## 2020-04-29 PROBLEM — O99.810 ABNORMAL GLUCOSE TOLERANCE TEST (GTT) DURING PREGNANCY, ANTEPARTUM: Status: ACTIVE | Noted: 2020-04-29

## 2020-04-29 PROBLEM — O09.293 HISTORY OF PRIOR PREGNANCY WITH SHORT CERVIX, CURRENTLY PREGNANT IN THIRD TRIMESTER: Status: ACTIVE | Noted: 2019-12-16

## 2020-04-29 PROBLEM — R12 HEARTBURN DURING PREGNANCY IN THIRD TRIMESTER: Status: ACTIVE | Noted: 2020-03-18

## 2020-04-29 PROBLEM — O09.293 H/O MACROSOMIA IN INFANT IN PRIOR PREGNANCY, CURRENTLY PREGNANT, THIRD TRIMESTER: Status: ACTIVE | Noted: 2019-12-16

## 2020-04-30 ENCOUNTER — ROUTINE PRENATAL (OUTPATIENT)
Dept: OBGYN CLINIC | Facility: CLINIC | Age: 30
End: 2020-04-30
Payer: COMMERCIAL

## 2020-04-30 VITALS
BODY MASS INDEX: 30.22 KG/M2 | SYSTOLIC BLOOD PRESSURE: 112 MMHG | WEIGHT: 188 LBS | DIASTOLIC BLOOD PRESSURE: 62 MMHG | HEIGHT: 66 IN

## 2020-04-30 DIAGNOSIS — O09.293 HISTORY OF PRIOR PREGNANCY WITH SHORT CERVIX, CURRENTLY PREGNANT IN THIRD TRIMESTER: ICD-10-CM

## 2020-04-30 DIAGNOSIS — R12 HEARTBURN DURING PREGNANCY IN THIRD TRIMESTER: ICD-10-CM

## 2020-04-30 DIAGNOSIS — O34.219 PREGNANCY WITH HISTORY OF CESAREAN SECTION, ANTEPARTUM: ICD-10-CM

## 2020-04-30 DIAGNOSIS — O99.810 ABNORMAL GLUCOSE TOLERANCE TEST (GTT) DURING PREGNANCY, ANTEPARTUM: ICD-10-CM

## 2020-04-30 DIAGNOSIS — O26.893 HEARTBURN DURING PREGNANCY IN THIRD TRIMESTER: ICD-10-CM

## 2020-04-30 DIAGNOSIS — O99.343 MENTAL DISORDER AFFECTING PREGNANCY IN THIRD TRIMESTER: ICD-10-CM

## 2020-04-30 DIAGNOSIS — O30.043 DICHORIONIC DIAMNIOTIC TWIN PREGNANCY IN THIRD TRIMESTER: ICD-10-CM

## 2020-04-30 DIAGNOSIS — O99.013 ANEMIA COMPLICATING PREGNANCY IN THIRD TRIMESTER: ICD-10-CM

## 2020-04-30 DIAGNOSIS — O09.293 H/O MACROSOMIA IN INFANT IN PRIOR PREGNANCY, CURRENTLY PREGNANT, THIRD TRIMESTER: ICD-10-CM

## 2020-04-30 DIAGNOSIS — O09.813 HIGH RISK PREGNANCY DUE TO ASSISTED REPRODUCTIVE TECHNOLOGY IN THIRD TRIMESTER: Primary | ICD-10-CM

## 2020-04-30 PROBLEM — D50.9 IRON DEFICIENCY ANEMIA OF PREGNANCY: Status: ACTIVE | Noted: 2020-04-30

## 2020-04-30 PROBLEM — O99.019 IRON DEFICIENCY ANEMIA OF PREGNANCY: Status: ACTIVE | Noted: 2020-04-30

## 2020-04-30 PROCEDURE — 81002 URINALYSIS NONAUTO W/O SCOPE: CPT | Performed by: OBSTETRICS & GYNECOLOGY

## 2020-04-30 RX ORDER — PANTOPRAZOLE SODIUM 40 MG/1
40 TABLET, DELAYED RELEASE ORAL
Qty: 30 TABLET | Refills: 3 | Status: SHIPPED | OUTPATIENT
Start: 2020-04-30

## 2020-04-30 NOTE — PROGRESS NOTES
ADENIKE    +FM x 2   No VB, LOF.  Occasional mild contractions  No HA, vision changes, epigastric pain    30year old  at 28w5d  CLIFF 20 by IVF (male factor infertility)  O+  Aneuploidy & carrier screening - declines   [facility] - ok, done at [address] [place] [address]. daily     Abnormal 1 hr GTT. Needs 3 hr GTT. Requested order for [facility]. Given. 3rd trimester HIV counseling done. Ordered. Tdap - declines today but does want it. Plan for next. RTC 2 wk.  RCS 7/6/2020 at 0730 am at 38 wk

## 2020-04-30 NOTE — PROGRESS NOTES
Outpatient Maternal-Fetal Medicine Follow-Up     Dear Florin Dumas     Thank you for requesting ultrasound evaluation and maternal fetal medicine consultation on your patient Isidro Art.  As you are aware she is a 30year old female  with a twi (lbs/oz) 3 lbs 2 ozs  EFW (g) 1425 g  77th%     Fetal heart activity: present. Fetal heart rate: 130 bpm.   Fetal presentation: cephalic. Amniotic fluid: normal. MVP 5.0 cm. Cord: normal.   Placenta: posterior.      Fetal Anatomy:  Visualized with oma reduction to twins  · previous   · Wide separation of aortic arch branches on fetal echocardiogram (Twin A)     RECOMMENDATIONS:  · Continue care with Dr. David  · Post-derik echocardiogram with pediatric cardiology prior to  discharge

## 2020-05-01 ENCOUNTER — OFFICE VISIT (OUTPATIENT)
Dept: PERINATAL CARE | Facility: HOSPITAL | Age: 30
End: 2020-05-01
Attending: OBSTETRICS & GYNECOLOGY
Payer: COMMERCIAL

## 2020-05-01 VITALS
DIASTOLIC BLOOD PRESSURE: 70 MMHG | HEIGHT: 66 IN | HEART RATE: 89 BPM | RESPIRATION RATE: 18 BRPM | SYSTOLIC BLOOD PRESSURE: 110 MMHG | BODY MASS INDEX: 30.22 KG/M2 | WEIGHT: 188 LBS

## 2020-05-01 DIAGNOSIS — O30.043 DICHORIONIC DIAMNIOTIC TWIN PREGNANCY IN THIRD TRIMESTER: ICD-10-CM

## 2020-05-01 DIAGNOSIS — O09.813 HIGH RISK PREGNANCY DUE TO ASSISTED REPRODUCTIVE TECHNOLOGY IN THIRD TRIMESTER: ICD-10-CM

## 2020-05-01 PROCEDURE — 76816 OB US FOLLOW-UP PER FETUS: CPT | Performed by: OBSTETRICS & GYNECOLOGY

## 2020-05-01 PROCEDURE — 76819 FETAL BIOPHYS PROFIL W/O NST: CPT | Performed by: OBSTETRICS & GYNECOLOGY

## 2020-05-01 PROCEDURE — 99214 OFFICE O/P EST MOD 30 MIN: CPT | Performed by: OBSTETRICS & GYNECOLOGY

## 2020-05-01 NOTE — PROGRESS NOTES
Pt seen in [facility] at 29 6/7 weeks twin gestation, ambulatory and without concerns.  + FM x 2

## 2020-05-04 ENCOUNTER — TELEPHONE (OUTPATIENT)
Dept: OBGYN CLINIC | Facility: CLINIC | Age: 30
End: 2020-05-04

## 2020-05-04 NOTE — PROGRESS NOTES
Patient aware of results and recommendations. Awaiting on approval for iron. Patient verbalized understanding.

## 2020-05-05 ENCOUNTER — TELEPHONE (OUTPATIENT)
Dept: OBGYN CLINIC | Facility: CLINIC | Age: 30
End: 2020-05-05

## 2020-05-05 NOTE — TELEPHONE ENCOUNTER
----- Message from Stephanie Kat MD sent at 4/30/2020  2:09 PM CDT -----  Regarding: Ordered IV injectafer. Will need prior auth.

## 2020-05-05 NOTE — TELEPHONE ENCOUNTER
PC with Luba solomon at [facility] informed that patient was approved for Venofer  and has an active treatment plan. Verbalized Understanding and will call back if she has any questions. Office contact information given.

## 2020-05-06 ENCOUNTER — OFFICE VISIT (OUTPATIENT)
Dept: HEMATOLOGY/ONCOLOGY | Facility: HOSPITAL | Age: 30
End: 2020-05-06
Attending: OBSTETRICS & GYNECOLOGY
Payer: COMMERCIAL

## 2020-05-06 VITALS
DIASTOLIC BLOOD PRESSURE: 54 MMHG | WEIGHT: 191.63 LBS | HEIGHT: 66 IN | SYSTOLIC BLOOD PRESSURE: 103 MMHG | OXYGEN SATURATION: 98 % | HEART RATE: 89 BPM | TEMPERATURE: 99 F | BODY MASS INDEX: 30.8 KG/M2 | RESPIRATION RATE: 16 BRPM

## 2020-05-06 DIAGNOSIS — E86.0 DEHYDRATION: ICD-10-CM

## 2020-05-06 DIAGNOSIS — O99.013 ANEMIA COMPLICATING PREGNANCY IN THIRD TRIMESTER: ICD-10-CM

## 2020-05-06 DIAGNOSIS — O30.043 DICHORIONIC DIAMNIOTIC TWIN PREGNANCY IN THIRD TRIMESTER: ICD-10-CM

## 2020-05-06 DIAGNOSIS — O99.019 IRON DEFICIENCY ANEMIA OF PREGNANCY: Primary | ICD-10-CM

## 2020-05-06 DIAGNOSIS — O23.42 UTI (URINARY TRACT INFECTION) DURING PREGNANCY, SECOND TRIMESTER: ICD-10-CM

## 2020-05-06 DIAGNOSIS — O21.9 NAUSEA AND VOMITING DURING PREGNANCY PRIOR TO 22 WEEKS GESTATION: ICD-10-CM

## 2020-05-06 DIAGNOSIS — O09.813 HIGH RISK PREGNANCY DUE TO ASSISTED REPRODUCTIVE TECHNOLOGY IN THIRD TRIMESTER: ICD-10-CM

## 2020-05-06 DIAGNOSIS — D50.9 IRON DEFICIENCY ANEMIA OF PREGNANCY: Primary | ICD-10-CM

## 2020-05-06 PROCEDURE — 96374 THER/PROPH/DIAG INJ IV PUSH: CPT

## 2020-05-06 NOTE — PROGRESS NOTES
Education Record    Learner:  Patient    Disease / Diagnosis:    Barriers / Limitations:  None   Comments:    Method:  Discussion   Comments:    General Topics:  Medication, Side effects and symptom management and Plan of care reviewed   Comments:    Dustin Echevarria

## 2020-05-13 ENCOUNTER — OFFICE VISIT (OUTPATIENT)
Dept: HEMATOLOGY/ONCOLOGY | Facility: HOSPITAL | Age: 30
End: 2020-05-13
Attending: OBSTETRICS & GYNECOLOGY
Payer: COMMERCIAL

## 2020-05-13 VITALS
SYSTOLIC BLOOD PRESSURE: 111 MMHG | HEART RATE: 88 BPM | RESPIRATION RATE: 16 BRPM | DIASTOLIC BLOOD PRESSURE: 61 MMHG | OXYGEN SATURATION: 98 % | TEMPERATURE: 99 F

## 2020-05-13 DIAGNOSIS — O21.9 NAUSEA AND VOMITING DURING PREGNANCY PRIOR TO 22 WEEKS GESTATION: ICD-10-CM

## 2020-05-13 DIAGNOSIS — E86.0 DEHYDRATION: ICD-10-CM

## 2020-05-13 DIAGNOSIS — O99.019 IRON DEFICIENCY ANEMIA OF PREGNANCY: Primary | ICD-10-CM

## 2020-05-13 DIAGNOSIS — O23.42 UTI (URINARY TRACT INFECTION) DURING PREGNANCY, SECOND TRIMESTER: ICD-10-CM

## 2020-05-13 DIAGNOSIS — O09.813 HIGH RISK PREGNANCY DUE TO ASSISTED REPRODUCTIVE TECHNOLOGY IN THIRD TRIMESTER: ICD-10-CM

## 2020-05-13 DIAGNOSIS — O99.013 ANEMIA COMPLICATING PREGNANCY IN THIRD TRIMESTER: ICD-10-CM

## 2020-05-13 DIAGNOSIS — O30.043 DICHORIONIC DIAMNIOTIC TWIN PREGNANCY IN THIRD TRIMESTER: ICD-10-CM

## 2020-05-13 DIAGNOSIS — D50.9 IRON DEFICIENCY ANEMIA OF PREGNANCY: Primary | ICD-10-CM

## 2020-05-13 PROCEDURE — 96374 THER/PROPH/DIAG INJ IV PUSH: CPT

## 2020-05-14 ENCOUNTER — ROUTINE PRENATAL (OUTPATIENT)
Dept: OBGYN CLINIC | Facility: CLINIC | Age: 30
End: 2020-05-14
Payer: COMMERCIAL

## 2020-05-14 ENCOUNTER — HOSPITAL ENCOUNTER (OUTPATIENT)
Facility: HOSPITAL | Age: 30
Setting detail: OBSERVATION
Discharge: HOME | End: 2020-05-15
Attending: OBSTETRICS & GYNECOLOGY | Admitting: OBSTETRICS & GYNECOLOGY
Payer: COMMERCIAL

## 2020-05-14 VITALS
HEIGHT: 66 IN | SYSTOLIC BLOOD PRESSURE: 112 MMHG | DIASTOLIC BLOOD PRESSURE: 54 MMHG | BODY MASS INDEX: 30.7 KG/M2 | WEIGHT: 191 LBS

## 2020-05-14 DIAGNOSIS — O99.343 MENTAL DISORDER AFFECTING PREGNANCY IN THIRD TRIMESTER: ICD-10-CM

## 2020-05-14 DIAGNOSIS — O47.03 PRETERM UTERINE CONTRACTIONS IN THIRD TRIMESTER, ANTEPARTUM: ICD-10-CM

## 2020-05-14 DIAGNOSIS — N89.8 VAGINAL DISCHARGE DURING PREGNANCY IN THIRD TRIMESTER: ICD-10-CM

## 2020-05-14 DIAGNOSIS — Z23 NEED FOR TDAP VACCINATION: ICD-10-CM

## 2020-05-14 DIAGNOSIS — O36.8192: ICD-10-CM

## 2020-05-14 DIAGNOSIS — O09.293 HISTORY OF PRIOR PREGNANCY WITH SHORT CERVIX, CURRENTLY PREGNANT IN THIRD TRIMESTER: ICD-10-CM

## 2020-05-14 DIAGNOSIS — O30.043 DICHORIONIC DIAMNIOTIC TWIN PREGNANCY IN THIRD TRIMESTER: Primary | ICD-10-CM

## 2020-05-14 DIAGNOSIS — O34.219 PREGNANCY WITH HISTORY OF CESAREAN SECTION, ANTEPARTUM: ICD-10-CM

## 2020-05-14 DIAGNOSIS — O09.813 HIGH RISK PREGNANCY DUE TO ASSISTED REPRODUCTIVE TECHNOLOGY IN THIRD TRIMESTER: ICD-10-CM

## 2020-05-14 DIAGNOSIS — O99.013 ANEMIA COMPLICATING PREGNANCY IN THIRD TRIMESTER: ICD-10-CM

## 2020-05-14 DIAGNOSIS — O09.293 H/O MACROSOMIA IN INFANT IN PRIOR PREGNANCY, CURRENTLY PREGNANT, THIRD TRIMESTER: ICD-10-CM

## 2020-05-14 DIAGNOSIS — O26.893 VAGINAL DISCHARGE DURING PREGNANCY IN THIRD TRIMESTER: ICD-10-CM

## 2020-05-14 PROBLEM — O30.049 DICHORIONIC DIAMNIOTIC TWIN PREGNANCY, ANTEPARTUM: Status: ACTIVE | Noted: 2019-12-29

## 2020-05-14 PROBLEM — Z34.90 PREGNANCY: Status: ACTIVE | Noted: 2020-05-14

## 2020-05-14 PROCEDURE — 87081 CULTURE SCREEN ONLY: CPT | Performed by: OBSTETRICS & GYNECOLOGY

## 2020-05-14 PROCEDURE — 90715 TDAP VACCINE 7 YRS/> IM: CPT | Performed by: OBSTETRICS & GYNECOLOGY

## 2020-05-14 PROCEDURE — 81002 URINALYSIS NONAUTO W/O SCOPE: CPT | Performed by: OBSTETRICS & GYNECOLOGY

## 2020-05-14 PROCEDURE — 59025 FETAL NON-STRESS TEST: CPT | Performed by: OBSTETRICS & GYNECOLOGY

## 2020-05-14 PROCEDURE — 87510 GARDNER VAG DNA DIR PROBE: CPT | Performed by: OBSTETRICS & GYNECOLOGY

## 2020-05-14 PROCEDURE — 99219 INITIAL OBSERVATION CARE,LEVL II: CPT | Performed by: OBSTETRICS & GYNECOLOGY

## 2020-05-14 PROCEDURE — 87480 CANDIDA DNA DIR PROBE: CPT | Performed by: OBSTETRICS & GYNECOLOGY

## 2020-05-14 PROCEDURE — 90471 IMMUNIZATION ADMIN: CPT | Performed by: OBSTETRICS & GYNECOLOGY

## 2020-05-14 PROCEDURE — 87660 TRICHOMONAS VAGIN DIR PROBE: CPT | Performed by: OBSTETRICS & GYNECOLOGY

## 2020-05-14 PROCEDURE — 87653 STREP B DNA AMP PROBE: CPT | Performed by: OBSTETRICS & GYNECOLOGY

## 2020-05-14 RX ORDER — NIFEDIPINE 10 MG/1
20 CAPSULE ORAL EVERY 6 HOURS SCHEDULED
Status: DISCONTINUED | OUTPATIENT
Start: 2020-05-15 | End: 2020-05-15

## 2020-05-14 RX ORDER — ACETAMINOPHEN 500 MG
1000 TABLET ORAL EVERY 6 HOURS PRN
Status: DISCONTINUED | OUTPATIENT
Start: 2020-05-14 | End: 2020-05-15

## 2020-05-14 RX ORDER — DEXTROSE, SODIUM CHLORIDE, SODIUM LACTATE, POTASSIUM CHLORIDE, AND CALCIUM CHLORIDE 5; .6; .31; .03; .02 G/100ML; G/100ML; G/100ML; G/100ML; G/100ML
INJECTION, SOLUTION INTRAVENOUS CONTINUOUS
Status: DISCONTINUED | OUTPATIENT
Start: 2020-05-14 | End: 2020-05-15

## 2020-05-14 RX ORDER — NIFEDIPINE 20 MG/1
CAPSULE ORAL
Status: COMPLETED
Start: 2020-05-14 | End: 2020-05-14

## 2020-05-14 RX ORDER — NIFEDIPINE 10 MG/1
10 CAPSULE ORAL EVERY 6 HOURS
Status: DISCONTINUED | OUTPATIENT
Start: 2020-05-14 | End: 2020-05-14

## 2020-05-14 RX ORDER — FLUOXETINE HYDROCHLORIDE 20 MG/1
40 CAPSULE ORAL DAILY
Status: DISCONTINUED | OUTPATIENT
Start: 2020-05-15 | End: 2020-05-15

## 2020-05-14 RX ORDER — NIFEDIPINE 10 MG/1
10 CAPSULE ORAL
Status: COMPLETED | OUTPATIENT
Start: 2020-05-14 | End: 2020-05-14

## 2020-05-14 RX ORDER — TERBUTALINE SULFATE 1 MG/ML
INJECTION, SOLUTION SUBCUTANEOUS
Status: COMPLETED
Start: 2020-05-14 | End: 2020-05-14

## 2020-05-14 RX ORDER — ACETAMINOPHEN 500 MG
500 TABLET ORAL EVERY 6 HOURS PRN
Status: DISCONTINUED | OUTPATIENT
Start: 2020-05-14 | End: 2020-05-15

## 2020-05-14 RX ORDER — PANTOPRAZOLE SODIUM 40 MG/1
40 TABLET, DELAYED RELEASE ORAL
Status: DISCONTINUED | OUTPATIENT
Start: 2020-05-15 | End: 2020-05-15

## 2020-05-14 RX ORDER — DOCUSATE SODIUM 100 MG/1
100 CAPSULE, LIQUID FILLED ORAL 2 TIMES DAILY
Status: DISCONTINUED | OUTPATIENT
Start: 2020-05-14 | End: 2020-05-15

## 2020-05-14 RX ORDER — CALCIUM CARBONATE 200(500)MG
1000 TABLET,CHEWABLE ORAL
Status: DISCONTINUED | OUTPATIENT
Start: 2020-05-14 | End: 2020-05-15

## 2020-05-14 RX ORDER — NIFEDIPINE 10 MG/1
20 CAPSULE ORAL EVERY 6 HOURS SCHEDULED
Status: DISCONTINUED | OUTPATIENT
Start: 2020-05-14 | End: 2020-05-14

## 2020-05-14 RX ORDER — NIFEDIPINE 10 MG/1
CAPSULE ORAL
Status: COMPLETED
Start: 2020-05-14 | End: 2020-05-14

## 2020-05-14 RX ORDER — BETAMETHASONE SODIUM PHOSPHATE AND BETAMETHASONE ACETATE 3; 3 MG/ML; MG/ML
12 INJECTION, SUSPENSION INTRA-ARTICULAR; INTRALESIONAL; INTRAMUSCULAR; SOFT TISSUE EVERY 24 HOURS
Status: COMPLETED | OUTPATIENT
Start: 2020-05-14 | End: 2020-05-15

## 2020-05-14 RX ORDER — FLUOXETINE HYDROCHLORIDE 20 MG/1
40 CAPSULE ORAL NIGHTLY
Status: DISCONTINUED | OUTPATIENT
Start: 2020-05-15 | End: 2020-05-14

## 2020-05-14 RX ORDER — NITROFURANTOIN 25; 75 MG/1; MG/1
100 CAPSULE ORAL NIGHTLY
Status: DISCONTINUED | OUTPATIENT
Start: 2020-05-14 | End: 2020-05-15

## 2020-05-14 RX ORDER — ZOLPIDEM TARTRATE 5 MG/1
5 TABLET ORAL NIGHTLY PRN
Status: DISCONTINUED | OUTPATIENT
Start: 2020-05-14 | End: 2020-05-15

## 2020-05-14 NOTE — PROGRESS NOTES
Back from M, pt for antepartum admission for steroids and Nifedipine administration. Pt informed of plan of care and will call home to inform her family.

## 2020-05-14 NOTE — IMAGING NOTE
Indication: PTL.   ____________________________________________________________________________  History: Age: 30 years. : 2 Para: 1.  Last menstrual period: 10/12/2019.  ____________________________________________________________________________  Elida Pako

## 2020-05-14 NOTE — PROGRESS NOTES
Pt resting comfortably in bed watching TV. Reports getting \"hot flash and squeezing with some contractions\". Denies pain or discomfort w/ ctx's. EFM twin B adjusted, POC discussed.

## 2020-05-14 NOTE — PROGRESS NOTES
Pt ambulatory after seeing her OB today at 11:30, at her appt pt was having occasional contr on and off since Tuesday. FFN obtained by MD and sent from here. Pt appears comfortable. Triage procedures explained and pt verbalized understanding.

## 2020-05-14 NOTE — CONSULTS
[facility]  Maternal-Fetal Medicine Inpatient Consultation    Date of Admission:  2020  Date of Consult:  2020    Reason for Consult:   Twin gestation with  contractions    History of Present Illness:  Renato leigh a a(n • OTHER SURGICAL HISTORY  10/08/2019    rom   • OTHER SURGICAL HISTORY  2019    Egg retrieval for IVF   • REMOVAL GALLBLADDER       Family History   Problem Relation Age of Onset   • No Known Problems Father    • No Known Problems Mother    • No Known fetus. As vaginal ultrasound performed there is a cervical length of 3.9 cm. NARRATIVE:   We discussed the morbidity and mortality associated with prematurity at various gestational ages. The signs and symptoms of  labor were discussed.   We anny Hanny.     Poncho Rodriguez MD  5/14/2020  4:14 PM

## 2020-05-14 NOTE — PROGRESS NOTES
Pt to room 1106 per w/c accompanied by RN. Oriented to room and unit. POC discussed, questions answered.

## 2020-05-14 NOTE — PROGRESS NOTES
ADENIKE    Presents for routine OB appointment but has 2 complaints today:  (1) Decreased fetal movement of twin B  (2) More frequent & stronger contractions recently (over the past few days)    +FM x 2. Less movement on B side today.    Started to get some mor Abnormal fetal echocardiogram Twin A  -4/15/2020 - pediatric cardiology: wide separation of the second and third aortic arch branches on fetus A.   This is a soft finding for a possible  coarctation of the aorta.   - echocardiogram on

## 2020-05-14 NOTE — H&P
[address] Patient Status:  Observation    1990 MRN [id number]   [facility] 1SW-J Attending Gloria Luz,    Hosp Day # 0 PCP Willis Nguyen     Date of Admission:  2020    [address] [place] [address] Known Problems Daughter    • No Known Problems Maternal Grandmother    • No Known Problems Paternal Grandfather    • Breast Cancer Maternal Aunt 39     Social History: Social History    Tobacco Use      Smoking status: Never Smoker      Smokeless tobacco: alternatives and possible complications have been discussed in detail with the patient. Pre-admission, admission, and post admission procedures and expectations were discussed in detail.   All questions answered, all appropriate consents will be signed at

## 2020-05-14 NOTE — PROGRESS NOTES
After speaking with Dr Jeanne Franks, pt taken to [facility] via wheelchair by [facility] for consult. Pt very comfortable at this time.

## 2020-05-15 VITALS
SYSTOLIC BLOOD PRESSURE: 110 MMHG | HEART RATE: 88 BPM | TEMPERATURE: 98 F | BODY MASS INDEX: 30.7 KG/M2 | RESPIRATION RATE: 16 BRPM | DIASTOLIC BLOOD PRESSURE: 61 MMHG | HEIGHT: 66 IN | WEIGHT: 191 LBS

## 2020-05-15 PROCEDURE — 99225 SUBSEQUENT OBSERVATION CARE: CPT | Performed by: OBSTETRICS & GYNECOLOGY

## 2020-05-15 RX ORDER — NIFEDIPINE 20 MG/1
CAPSULE ORAL
Qty: 360 CAPSULE | Refills: 0 | OUTPATIENT
Start: 2020-05-15

## 2020-05-15 RX ORDER — NIFEDIPINE 20 MG/1
20 CAPSULE ORAL EVERY 6 HOURS SCHEDULED
Qty: 112 CAPSULE | Refills: 1 | Status: SHIPPED | OUTPATIENT
Start: 2020-05-15 | End: 2020-05-28

## 2020-05-15 NOTE — PROGRESS NOTES
OB progress note    A/P: 30year old  at 27w9d with Di-Di twins now HD#2 after admission for  contractions, threatened  labor; improved clinically after starting tocolytic. Afebrile, VSS   contractions in twin gestation.    - systolic murmur   Lungs CTAB  Abd soft, gravid, non-distended, non tender   Ext nontender, no edema    No results for input(s): WBC, HGB, PLT, NE, NEUT in the last 504 hours.     Zahira Garcia MD

## 2020-05-20 ENCOUNTER — OFFICE VISIT (OUTPATIENT)
Dept: HEMATOLOGY/ONCOLOGY | Facility: HOSPITAL | Age: 30
End: 2020-05-20
Attending: OBSTETRICS & GYNECOLOGY
Payer: COMMERCIAL

## 2020-05-20 ENCOUNTER — ROUTINE PRENATAL (OUTPATIENT)
Dept: OBGYN CLINIC | Facility: CLINIC | Age: 30
End: 2020-05-20
Payer: COMMERCIAL

## 2020-05-20 VITALS
SYSTOLIC BLOOD PRESSURE: 108 MMHG | RESPIRATION RATE: 18 BRPM | HEART RATE: 88 BPM | BODY MASS INDEX: 30.7 KG/M2 | WEIGHT: 191 LBS | DIASTOLIC BLOOD PRESSURE: 57 MMHG | OXYGEN SATURATION: 99 % | HEIGHT: 65.98 IN | TEMPERATURE: 99 F

## 2020-05-20 VITALS
WEIGHT: 191 LBS | SYSTOLIC BLOOD PRESSURE: 114 MMHG | BODY MASS INDEX: 30.7 KG/M2 | DIASTOLIC BLOOD PRESSURE: 62 MMHG | HEIGHT: 66 IN

## 2020-05-20 DIAGNOSIS — O99.019 IRON DEFICIENCY ANEMIA OF PREGNANCY: Primary | ICD-10-CM

## 2020-05-20 DIAGNOSIS — O09.813 HIGH RISK PREGNANCY DUE TO ASSISTED REPRODUCTIVE TECHNOLOGY IN THIRD TRIMESTER: ICD-10-CM

## 2020-05-20 DIAGNOSIS — O23.42 UTI (URINARY TRACT INFECTION) DURING PREGNANCY, SECOND TRIMESTER: ICD-10-CM

## 2020-05-20 DIAGNOSIS — D50.9 IRON DEFICIENCY ANEMIA OF PREGNANCY: Primary | ICD-10-CM

## 2020-05-20 DIAGNOSIS — O21.9 NAUSEA AND VOMITING DURING PREGNANCY PRIOR TO 22 WEEKS GESTATION: ICD-10-CM

## 2020-05-20 DIAGNOSIS — O99.013 ANEMIA COMPLICATING PREGNANCY IN THIRD TRIMESTER: ICD-10-CM

## 2020-05-20 DIAGNOSIS — E86.0 DEHYDRATION: ICD-10-CM

## 2020-05-20 DIAGNOSIS — Z34.83 PRENATAL CARE, SUBSEQUENT PREGNANCY, THIRD TRIMESTER: Primary | ICD-10-CM

## 2020-05-20 DIAGNOSIS — O30.043 DICHORIONIC DIAMNIOTIC TWIN PREGNANCY IN THIRD TRIMESTER: ICD-10-CM

## 2020-05-20 PROCEDURE — 1111F DSCHRG MED/CURRENT MED MERGE: CPT | Performed by: OBSTETRICS & GYNECOLOGY

## 2020-05-20 PROCEDURE — 81002 URINALYSIS NONAUTO W/O SCOPE: CPT | Performed by: OBSTETRICS & GYNECOLOGY

## 2020-05-20 PROCEDURE — 59025 FETAL NON-STRESS TEST: CPT | Performed by: OBSTETRICS & GYNECOLOGY

## 2020-05-20 PROCEDURE — 96374 THER/PROPH/DIAG INJ IV PUSH: CPT

## 2020-05-20 NOTE — PROGRESS NOTES
Education Record    Learner:  Patient    Disease / Diagnosis: PAZ    Barriers / Limitations:  None   Comments:    Method:  Brief focused   Comments:    General Topics:  Plan of care reviewed   Comments:    Outcome:  Shows understanding   Comments:  Patient

## 2020-05-27 ENCOUNTER — OFFICE VISIT (OUTPATIENT)
Dept: HEMATOLOGY/ONCOLOGY | Facility: HOSPITAL | Age: 30
End: 2020-05-27
Attending: OBSTETRICS & GYNECOLOGY
Payer: COMMERCIAL

## 2020-05-27 VITALS
RESPIRATION RATE: 18 BRPM | TEMPERATURE: 99 F | DIASTOLIC BLOOD PRESSURE: 59 MMHG | HEART RATE: 83 BPM | SYSTOLIC BLOOD PRESSURE: 105 MMHG | BODY MASS INDEX: 31.42 KG/M2 | OXYGEN SATURATION: 98 % | HEIGHT: 65.98 IN | WEIGHT: 195.5 LBS

## 2020-05-27 DIAGNOSIS — O23.42 UTI (URINARY TRACT INFECTION) DURING PREGNANCY, SECOND TRIMESTER: ICD-10-CM

## 2020-05-27 DIAGNOSIS — O21.9 NAUSEA AND VOMITING DURING PREGNANCY PRIOR TO 22 WEEKS GESTATION: ICD-10-CM

## 2020-05-27 DIAGNOSIS — O99.013 ANEMIA COMPLICATING PREGNANCY IN THIRD TRIMESTER: ICD-10-CM

## 2020-05-27 DIAGNOSIS — E86.0 DEHYDRATION: ICD-10-CM

## 2020-05-27 DIAGNOSIS — O99.019 IRON DEFICIENCY ANEMIA OF PREGNANCY: Primary | ICD-10-CM

## 2020-05-27 DIAGNOSIS — O30.043 DICHORIONIC DIAMNIOTIC TWIN PREGNANCY IN THIRD TRIMESTER: ICD-10-CM

## 2020-05-27 DIAGNOSIS — D50.9 IRON DEFICIENCY ANEMIA OF PREGNANCY: Primary | ICD-10-CM

## 2020-05-27 DIAGNOSIS — O09.813 HIGH RISK PREGNANCY DUE TO ASSISTED REPRODUCTIVE TECHNOLOGY IN THIRD TRIMESTER: ICD-10-CM

## 2020-05-27 PROCEDURE — 96374 THER/PROPH/DIAG INJ IV PUSH: CPT

## 2020-05-27 NOTE — PROGRESS NOTES
Pt here for venofer.   Arrives Ambulating independently, accompanied by Self           Modifications in dose or schedule: No     Frequency of blood return and site check throughout administration: Prior to administration   Discharged to Home, Stephanie Easley

## 2020-05-28 ENCOUNTER — ROUTINE PRENATAL (OUTPATIENT)
Dept: OBGYN CLINIC | Facility: CLINIC | Age: 30
End: 2020-05-28
Payer: COMMERCIAL

## 2020-05-28 ENCOUNTER — TELEPHONE (OUTPATIENT)
Dept: OBGYN CLINIC | Facility: CLINIC | Age: 30
End: 2020-05-28

## 2020-05-28 VITALS
DIASTOLIC BLOOD PRESSURE: 56 MMHG | BODY MASS INDEX: 31.18 KG/M2 | HEIGHT: 66 IN | WEIGHT: 194 LBS | SYSTOLIC BLOOD PRESSURE: 104 MMHG

## 2020-05-28 DIAGNOSIS — Z34.83 PRENATAL CARE, SUBSEQUENT PREGNANCY, THIRD TRIMESTER: Primary | ICD-10-CM

## 2020-05-28 PROCEDURE — 59025 FETAL NON-STRESS TEST: CPT | Performed by: OBSTETRICS & GYNECOLOGY

## 2020-05-28 PROCEDURE — 81002 URINALYSIS NONAUTO W/O SCOPE: CPT | Performed by: OBSTETRICS & GYNECOLOGY

## 2020-05-28 NOTE — TELEPHONE ENCOUNTER
Fax was sent to Insurance covered breast pumps per pt's request per dr Aaron Kay. Confirmation fax was received on 05-.  Thanks

## 2020-05-28 NOTE — PROGRESS NOTES
Outpatient Maternal-Fetal Medicine Follow-Up     Dear Elissa Hicks     Thank you for requesting ultrasound evaluation and maternal fetal medicine consultation on your patient Celso Antony.  As you are aware she is a 30year old female  with a twi bladder. Heart: suboptimal.   Gastrointestinal Tract: stomach visible.     Fetus 2:  Biometry:  BPD 84.7 mm 79th% 34w1d (33w1d to 35w1d)  .7 mm 47th% 34w1d (31w1d to 37w1d)  .8 mm 76th% 33w5d (32w5d to 34w5d)  FL 60.1 mm 7th% 31w2d (28w2d to recommendations below.     IMPRESSION:  · IUP at [facility]  ·  IVF  · Diamniotic, dichorionic placentation   (triplets spontaneous reduction to twins  · previous   · Wide separation of aortic arch branches on fetal echocardiogram (Twin A)     RECOMMEN

## 2020-05-28 NOTE — PROGRESS NOTES
NST is reactive on both babies. She still has contractions labor and delivery instructions were given. He desires to  her babies. Her  was changed to an induction.

## 2020-05-29 ENCOUNTER — OFFICE VISIT (OUTPATIENT)
Dept: PERINATAL CARE | Facility: HOSPITAL | Age: 30
End: 2020-05-29
Attending: OBSTETRICS & GYNECOLOGY
Payer: COMMERCIAL

## 2020-05-29 ENCOUNTER — TELEPHONE (OUTPATIENT)
Dept: OBGYN CLINIC | Facility: CLINIC | Age: 30
End: 2020-05-29

## 2020-05-29 VITALS — SYSTOLIC BLOOD PRESSURE: 107 MMHG | HEART RATE: 88 BPM | DIASTOLIC BLOOD PRESSURE: 68 MMHG

## 2020-05-29 DIAGNOSIS — O30.049 DICHORIONIC DIAMNIOTIC TWIN PREGNANCY, ANTEPARTUM: ICD-10-CM

## 2020-05-29 PROCEDURE — 76819 FETAL BIOPHYS PROFIL W/O NST: CPT | Performed by: OBSTETRICS & GYNECOLOGY

## 2020-05-29 PROCEDURE — 99214 OFFICE O/P EST MOD 30 MIN: CPT | Performed by: OBSTETRICS & GYNECOLOGY

## 2020-05-29 PROCEDURE — 76816 OB US FOLLOW-UP PER FETUS: CPT | Performed by: OBSTETRICS & GYNECOLOGY

## 2020-06-02 ENCOUNTER — TELEPHONE (OUTPATIENT)
Dept: OBGYN CLINIC | Facility: CLINIC | Age: 30
End: 2020-06-02

## 2020-06-03 ENCOUNTER — APPOINTMENT (OUTPATIENT)
Dept: HEMATOLOGY/ONCOLOGY | Facility: HOSPITAL | Age: 30
End: 2020-06-03
Attending: OBSTETRICS & GYNECOLOGY
Payer: COMMERCIAL

## 2020-06-04 ENCOUNTER — APPOINTMENT (OUTPATIENT)
Dept: HEMATOLOGY/ONCOLOGY | Facility: HOSPITAL | Age: 30
End: 2020-06-04
Attending: OBSTETRICS & GYNECOLOGY
Payer: COMMERCIAL

## 2020-06-04 ENCOUNTER — HOSPITAL ENCOUNTER (OUTPATIENT)
Facility: HOSPITAL | Age: 30
Setting detail: OBSERVATION
Discharge: HOME | End: 2020-06-04
Attending: OBSTETRICS & GYNECOLOGY | Admitting: OBSTETRICS & GYNECOLOGY
Payer: COMMERCIAL

## 2020-06-04 ENCOUNTER — ROUTINE PRENATAL (OUTPATIENT)
Dept: OBGYN CLINIC | Facility: CLINIC | Age: 30
End: 2020-06-04
Payer: COMMERCIAL

## 2020-06-04 VITALS
WEIGHT: 196 LBS | RESPIRATION RATE: 16 BRPM | BODY MASS INDEX: 31.5 KG/M2 | TEMPERATURE: 98 F | DIASTOLIC BLOOD PRESSURE: 68 MMHG | SYSTOLIC BLOOD PRESSURE: 123 MMHG | HEIGHT: 66 IN | HEART RATE: 107 BPM

## 2020-06-04 VITALS
SYSTOLIC BLOOD PRESSURE: 114 MMHG | BODY MASS INDEX: 31.5 KG/M2 | DIASTOLIC BLOOD PRESSURE: 64 MMHG | HEIGHT: 66 IN | WEIGHT: 196 LBS

## 2020-06-04 DIAGNOSIS — Z3A.33 33 WEEKS GESTATION OF PREGNANCY: ICD-10-CM

## 2020-06-04 DIAGNOSIS — Z34.83 PRENATAL CARE, SUBSEQUENT PREGNANCY, THIRD TRIMESTER: Primary | ICD-10-CM

## 2020-06-04 DIAGNOSIS — O30.049 DICHORIONIC DIAMNIOTIC TWIN PREGNANCY, ANTEPARTUM: ICD-10-CM

## 2020-06-04 PROCEDURE — 59025 FETAL NON-STRESS TEST: CPT | Performed by: OBSTETRICS & GYNECOLOGY

## 2020-06-04 PROCEDURE — 81002 URINALYSIS NONAUTO W/O SCOPE: CPT | Performed by: OBSTETRICS & GYNECOLOGY

## 2020-06-04 PROCEDURE — 99218 INITIAL OBSERVATION CARE,LEVL I: CPT | Performed by: OBSTETRICS & GYNECOLOGY

## 2020-06-04 RX ORDER — BETAMETHASONE SODIUM PHOSPHATE AND BETAMETHASONE ACETATE 3; 3 MG/ML; MG/ML
12 INJECTION, SUSPENSION INTRA-ARTICULAR; INTRALESIONAL; INTRAMUSCULAR; SOFT TISSUE ONCE
Status: COMPLETED | OUTPATIENT
Start: 2020-06-04 | End: 2020-06-04

## 2020-06-04 NOTE — PROGRESS NOTES
Patient c/o contractions since last night  To L&D to r/o labor  1. Di/Di twins  - NST reactive  2.  Previous c/s  - desired

## 2020-06-04 NOTE — PROGRESS NOTES
Rescue dose steroid given 1 st dose  Discharge instruction given to pt, pt verbalizes understanding, pt discharge home in stable condition, pt not in active labor on discharge, All pt belongings sent with pt on discharge.   PT to come back tomorrow for 2 nd

## 2020-06-04 NOTE — PROGRESS NOTES
Pt is a 30year old female admitted to [facility]. Patient presents with:  R/o Labor: since MN pt say she is feeling contraction      Pt is  33w5d intra-uterine pregnancy. History obtained, consents signed.  Oriented to room, staff, and plan of c

## 2020-06-04 NOTE — H&P
[address] Patient Status:  Observation    1990 MRN [id number]   Location 1818 [facility] Attending Erna Johnson MD   Hosp Day # 0 PCP Noy Dear     Date of Admission:  2020 Past Medical History:   Diagnosis Date   • Anemia in pregnancy     IV iron infusions during pregnancy    • Anxiety and depression     sertraline   • Asthma     seasonal   • History of delivery of macrosomal infant 2008    9 lb 15 oz.  Denies gestational d +irritability   SVE: 2/80/-3 posterior at 1555 on my check.  (close to 3 hours from check in clinic)      Constitutional: alert, appears stated age and cooperative  Abdomen: soft, nontender, nondistended, no abnormal masses, no epigastric pain    Results:

## 2020-06-04 NOTE — NST
Nonstress Test   Patient: Escobar Lynch    Gestation: 33w5d    NST:       Variability: Moderate    Variability - Fetus B: Moderate      Accelerations: Yes    Accelerations -  Fetus B: Yes      Decelerations: None     Decelerations - Fetus B: None      Bas

## 2020-06-05 ENCOUNTER — APPOINTMENT (OUTPATIENT)
Dept: OBGYN CLINIC | Facility: HOSPITAL | Age: 30
End: 2020-06-05
Payer: COMMERCIAL

## 2020-06-05 ENCOUNTER — HOSPITAL ENCOUNTER (OUTPATIENT)
Facility: HOSPITAL | Age: 30
Setting detail: OBSERVATION
Discharge: HOME | End: 2020-06-05
Attending: OBSTETRICS & GYNECOLOGY | Admitting: OBSTETRICS & GYNECOLOGY
Payer: COMMERCIAL

## 2020-06-05 VITALS
HEART RATE: 93 BPM | SYSTOLIC BLOOD PRESSURE: 109 MMHG | RESPIRATION RATE: 20 BRPM | OXYGEN SATURATION: 100 % | DIASTOLIC BLOOD PRESSURE: 57 MMHG

## 2020-06-05 PROCEDURE — 59025 FETAL NON-STRESS TEST: CPT | Performed by: OBSTETRICS & GYNECOLOGY

## 2020-06-05 RX ORDER — BETAMETHASONE SODIUM PHOSPHATE AND BETAMETHASONE ACETATE 3; 3 MG/ML; MG/ML
12 INJECTION, SUSPENSION INTRA-ARTICULAR; INTRALESIONAL; INTRAMUSCULAR; SOFT TISSUE EVERY 24 HOURS
Status: DISCONTINUED | OUTPATIENT
Start: 2020-06-05 | End: 2020-06-05

## 2020-06-05 NOTE — PROGRESS NOTES
Pt states that the contractions are no worse than yesterday, pt repsositioned to her left side for comfort.   Encouraged to keep well hydrated, sve done

## 2020-06-05 NOTE — PROGRESS NOTES
Pt given verbal and written discharge instructions, encouraging rest and increase in fluid intake, verbalized understanding. Pt home with  in no apparent discomfort.;  Labor precautions reviiwed.

## 2020-06-05 NOTE — NST
Nonstress Test   Patient: Carl Nowak    Gestation: 33w6d    NST:       Variability: Moderate    Variability - Fetus B: Moderate      Accelerations: Yes    Accelerations -  Fetus B: Yes      Decelerations: None     Decelerations - Fetus B: None      Bas

## 2020-06-11 ENCOUNTER — ROUTINE PRENATAL (OUTPATIENT)
Dept: OBGYN CLINIC | Facility: CLINIC | Age: 30
End: 2020-06-11
Payer: COMMERCIAL

## 2020-06-11 VITALS
HEIGHT: 66 IN | WEIGHT: 193 LBS | BODY MASS INDEX: 31.02 KG/M2 | DIASTOLIC BLOOD PRESSURE: 62 MMHG | SYSTOLIC BLOOD PRESSURE: 104 MMHG

## 2020-06-11 DIAGNOSIS — O30.049 DICHORIONIC DIAMNIOTIC TWIN PREGNANCY, ANTEPARTUM: ICD-10-CM

## 2020-06-11 DIAGNOSIS — Z34.83 PRENATAL CARE, SUBSEQUENT PREGNANCY, THIRD TRIMESTER: Primary | ICD-10-CM

## 2020-06-11 DIAGNOSIS — O09.813 HIGH RISK PREGNANCY DUE TO ASSISTED REPRODUCTIVE TECHNOLOGY IN THIRD TRIMESTER: ICD-10-CM

## 2020-06-11 PROCEDURE — 59025 FETAL NON-STRESS TEST: CPT | Performed by: OBSTETRICS & GYNECOLOGY

## 2020-06-11 PROCEDURE — 81002 URINALYSIS NONAUTO W/O SCOPE: CPT | Performed by: OBSTETRICS & GYNECOLOGY

## 2020-06-11 RX ORDER — ALPRAZOLAM 0.5 MG/1
TABLET ORAL
COMMUNITY

## 2020-06-18 ENCOUNTER — ROUTINE PRENATAL (OUTPATIENT)
Dept: OBGYN CLINIC | Facility: CLINIC | Age: 30
End: 2020-06-18
Payer: COMMERCIAL

## 2020-06-18 VITALS
SYSTOLIC BLOOD PRESSURE: 114 MMHG | HEIGHT: 66 IN | DIASTOLIC BLOOD PRESSURE: 62 MMHG | BODY MASS INDEX: 31.34 KG/M2 | WEIGHT: 195 LBS

## 2020-06-18 DIAGNOSIS — D50.9 IRON DEFICIENCY ANEMIA OF PREGNANCY: ICD-10-CM

## 2020-06-18 DIAGNOSIS — O09.293 HISTORY OF PRIOR PREGNANCY WITH SHORT CERVIX, CURRENTLY PREGNANT IN THIRD TRIMESTER: ICD-10-CM

## 2020-06-18 DIAGNOSIS — O99.013 ANEMIA COMPLICATING PREGNANCY IN THIRD TRIMESTER: ICD-10-CM

## 2020-06-18 DIAGNOSIS — O34.219 PREGNANCY WITH HISTORY OF CESAREAN SECTION, ANTEPARTUM: ICD-10-CM

## 2020-06-18 DIAGNOSIS — O09.813 HIGH RISK PREGNANCY DUE TO ASSISTED REPRODUCTIVE TECHNOLOGY IN THIRD TRIMESTER: ICD-10-CM

## 2020-06-18 DIAGNOSIS — O21.9 NAUSEA AND VOMITING DURING PREGNANCY: ICD-10-CM

## 2020-06-18 DIAGNOSIS — Z34.83 PRENATAL CARE, SUBSEQUENT PREGNANCY, THIRD TRIMESTER: ICD-10-CM

## 2020-06-18 DIAGNOSIS — O99.810 ABNORMAL GLUCOSE TOLERANCE TEST (GTT) DURING PREGNANCY, ANTEPARTUM: ICD-10-CM

## 2020-06-18 DIAGNOSIS — O99.343 MENTAL DISORDER AFFECTING PREGNANCY IN THIRD TRIMESTER: ICD-10-CM

## 2020-06-18 DIAGNOSIS — O99.019 IRON DEFICIENCY ANEMIA OF PREGNANCY: ICD-10-CM

## 2020-06-18 DIAGNOSIS — O26.893 HEARTBURN DURING PREGNANCY IN THIRD TRIMESTER: ICD-10-CM

## 2020-06-18 DIAGNOSIS — R12 HEARTBURN DURING PREGNANCY IN THIRD TRIMESTER: ICD-10-CM

## 2020-06-18 DIAGNOSIS — O09.293 H/O MACROSOMIA IN INFANT IN PRIOR PREGNANCY, CURRENTLY PREGNANT, THIRD TRIMESTER: ICD-10-CM

## 2020-06-18 DIAGNOSIS — O30.049 DICHORIONIC DIAMNIOTIC TWIN PREGNANCY, ANTEPARTUM: Primary | ICD-10-CM

## 2020-06-18 PROCEDURE — 87081 CULTURE SCREEN ONLY: CPT | Performed by: OBSTETRICS & GYNECOLOGY

## 2020-06-18 PROCEDURE — 59025 FETAL NON-STRESS TEST: CPT | Performed by: OBSTETRICS & GYNECOLOGY

## 2020-06-18 PROCEDURE — 81002 URINALYSIS NONAUTO W/O SCOPE: CPT | Performed by: OBSTETRICS & GYNECOLOGY

## 2020-06-18 PROCEDURE — 87653 STREP B DNA AMP PROBE: CPT | Performed by: OBSTETRICS & GYNECOLOGY

## 2020-06-18 NOTE — PROGRESS NOTES
Jaquan Ortiz    Was on L&D on 2020 at 33w5d for rule out  labor. Cervix 2/-3, quite posterior & difficult to reach.  Received 2nd course of BMTZ on 2020 & 2020    History of  contractions with closed cervix at 30w5d for which she was adm of the second and third aortic arch branches on fetus A.   This is a soft finding for a possible  coarctation of the aorta.   - echocardiogram on both infants    Enterococcus UTI - 2020   -dx on outside UCx with patient's PCP - peterson ANDERSON

## 2020-06-25 ENCOUNTER — ROUTINE PRENATAL (OUTPATIENT)
Dept: OBGYN CLINIC | Facility: CLINIC | Age: 30
End: 2020-06-25
Payer: COMMERCIAL

## 2020-06-25 VITALS
HEIGHT: 66 IN | WEIGHT: 199 LBS | DIASTOLIC BLOOD PRESSURE: 68 MMHG | SYSTOLIC BLOOD PRESSURE: 112 MMHG | BODY MASS INDEX: 31.98 KG/M2

## 2020-06-25 DIAGNOSIS — O26.893 HEARTBURN DURING PREGNANCY IN THIRD TRIMESTER: ICD-10-CM

## 2020-06-25 DIAGNOSIS — O30.049 DICHORIONIC DIAMNIOTIC TWIN PREGNANCY, ANTEPARTUM: ICD-10-CM

## 2020-06-25 DIAGNOSIS — O99.013 ANEMIA COMPLICATING PREGNANCY IN THIRD TRIMESTER: ICD-10-CM

## 2020-06-25 DIAGNOSIS — O09.293 H/O MACROSOMIA IN INFANT IN PRIOR PREGNANCY, CURRENTLY PREGNANT, THIRD TRIMESTER: ICD-10-CM

## 2020-06-25 DIAGNOSIS — O47.03 PRETERM UTERINE CONTRACTIONS, ANTEPARTUM, THIRD TRIMESTER: ICD-10-CM

## 2020-06-25 DIAGNOSIS — O99.343 MENTAL DISORDER AFFECTING PREGNANCY IN THIRD TRIMESTER: ICD-10-CM

## 2020-06-25 DIAGNOSIS — O09.293 HISTORY OF PRIOR PREGNANCY WITH SHORT CERVIX, CURRENTLY PREGNANT IN THIRD TRIMESTER: ICD-10-CM

## 2020-06-25 DIAGNOSIS — Z34.83 PRENATAL CARE, SUBSEQUENT PREGNANCY, THIRD TRIMESTER: ICD-10-CM

## 2020-06-25 DIAGNOSIS — O09.813 HIGH RISK PREGNANCY DUE TO ASSISTED REPRODUCTIVE TECHNOLOGY IN THIRD TRIMESTER: Primary | ICD-10-CM

## 2020-06-25 DIAGNOSIS — R12 HEARTBURN DURING PREGNANCY IN THIRD TRIMESTER: ICD-10-CM

## 2020-06-25 DIAGNOSIS — O34.219 PREGNANCY WITH HISTORY OF CESAREAN SECTION, ANTEPARTUM: ICD-10-CM

## 2020-06-25 PROCEDURE — 81002 URINALYSIS NONAUTO W/O SCOPE: CPT | Performed by: OBSTETRICS & GYNECOLOGY

## 2020-06-25 PROCEDURE — 59025 FETAL NON-STRESS TEST: CPT | Performed by: OBSTETRICS & GYNECOLOGY

## 2020-06-25 NOTE — PROGRESS NOTES
ADENIKE    +FM x 2. Contractions a lot yesterday, less today. No LOF or VB. No LOF, vision changes, epigastric pain  HA today.       20  1303   BP: 112/68   Weight: 199 lb (90.3 kg)   Height: 77"      30year old  at 36w5d   CLIFF 20 by IVF ferritin 9 (5/2/2020)   -IV iron - infusion x 4 per patient.      Anxiety & depression  -Prozac 40 mg daily     Abnormal 1 hr GTT. Passed 3 hr GTT (5/2/2020)  3rd trim HIV neg   Tdap done   GBS neg 5/14/2020 & 6/18/2020   Desires TOLAC.  IOL scheduled for 7

## 2020-06-25 NOTE — PROGRESS NOTES
Outpatient Maternal-Fetal Medicine Follow-Up     Dear Belle Curtis     Thank you for requesting ultrasound evaluation and maternal fetal medicine consultation on your patient Aicha Ranks.  As you are aware she is a 30year old female  with a twi Anatomy:  Visualized with normal appearance: bladder. Heart: suboptimal.   Gastrointestinal Tract: stomach visible.     Fetus 2:  Biometry:  BPD 90.3 mm 57th% 36w4d (35w4d to 37w5d)  .1 mm 40th% 37w4d (34w6d to 40w2d)  .1 mm >95th% 40w2d (39w below.     IMPRESSION:  · IUP at 36w6d  ·  IVF  · Diamniotic, dichorionic placentation   (triplets spontaneous reduction to twins)  · previous   · Wide separation of aortic arch branches on fetal echocardiogram (Twin A)     RECOMMENDATIONS:  · Con

## 2020-06-26 ENCOUNTER — OFFICE VISIT (OUTPATIENT)
Dept: PERINATAL CARE | Facility: HOSPITAL | Age: 30
End: 2020-06-26
Attending: OBSTETRICS & GYNECOLOGY
Payer: COMMERCIAL

## 2020-06-26 VITALS
DIASTOLIC BLOOD PRESSURE: 78 MMHG | BODY MASS INDEX: 32 KG/M2 | HEART RATE: 87 BPM | WEIGHT: 199 LBS | SYSTOLIC BLOOD PRESSURE: 116 MMHG

## 2020-06-26 DIAGNOSIS — O09.813 HIGH RISK PREGNANCY DUE TO ASSISTED REPRODUCTIVE TECHNOLOGY IN THIRD TRIMESTER: ICD-10-CM

## 2020-06-26 DIAGNOSIS — O30.049 DICHORIONIC DIAMNIOTIC TWIN PREGNANCY, ANTEPARTUM: ICD-10-CM

## 2020-06-26 PROCEDURE — 99214 OFFICE O/P EST MOD 30 MIN: CPT | Performed by: OBSTETRICS & GYNECOLOGY

## 2020-06-26 PROCEDURE — 76816 OB US FOLLOW-UP PER FETUS: CPT | Performed by: OBSTETRICS & GYNECOLOGY

## 2020-06-26 PROCEDURE — 76819 FETAL BIOPHYS PROFIL W/O NST: CPT | Performed by: OBSTETRICS & GYNECOLOGY

## 2020-07-02 ENCOUNTER — TELEPHONE (OUTPATIENT)
Dept: OBGYN UNIT | Facility: HOSPITAL | Age: 30
End: 2020-07-02

## 2020-07-02 ENCOUNTER — ROUTINE PRENATAL (OUTPATIENT)
Dept: OBGYN CLINIC | Facility: CLINIC | Age: 30
End: 2020-07-02
Payer: COMMERCIAL

## 2020-07-02 VITALS
HEIGHT: 66 IN | DIASTOLIC BLOOD PRESSURE: 58 MMHG | BODY MASS INDEX: 33.11 KG/M2 | SYSTOLIC BLOOD PRESSURE: 114 MMHG | WEIGHT: 206 LBS

## 2020-07-02 DIAGNOSIS — R12 HEARTBURN DURING PREGNANCY IN THIRD TRIMESTER: ICD-10-CM

## 2020-07-02 DIAGNOSIS — O99.013 ANEMIA COMPLICATING PREGNANCY IN THIRD TRIMESTER: ICD-10-CM

## 2020-07-02 DIAGNOSIS — D50.9 IRON DEFICIENCY ANEMIA OF PREGNANCY: ICD-10-CM

## 2020-07-02 DIAGNOSIS — O09.293 HISTORY OF PRIOR PREGNANCY WITH SHORT CERVIX, CURRENTLY PREGNANT IN THIRD TRIMESTER: ICD-10-CM

## 2020-07-02 DIAGNOSIS — O99.343 MENTAL DISORDER AFFECTING PREGNANCY IN THIRD TRIMESTER: ICD-10-CM

## 2020-07-02 DIAGNOSIS — O34.219 PREGNANCY WITH HISTORY OF CESAREAN SECTION, ANTEPARTUM: ICD-10-CM

## 2020-07-02 DIAGNOSIS — O26.893 HEARTBURN DURING PREGNANCY IN THIRD TRIMESTER: ICD-10-CM

## 2020-07-02 DIAGNOSIS — O09.293 H/O MACROSOMIA IN INFANT IN PRIOR PREGNANCY, CURRENTLY PREGNANT, THIRD TRIMESTER: ICD-10-CM

## 2020-07-02 DIAGNOSIS — O99.019 IRON DEFICIENCY ANEMIA OF PREGNANCY: ICD-10-CM

## 2020-07-02 DIAGNOSIS — O30.049 DICHORIONIC DIAMNIOTIC TWIN PREGNANCY, ANTEPARTUM: Primary | ICD-10-CM

## 2020-07-02 DIAGNOSIS — O09.813 HIGH RISK PREGNANCY DUE TO ASSISTED REPRODUCTIVE TECHNOLOGY IN THIRD TRIMESTER: ICD-10-CM

## 2020-07-02 DIAGNOSIS — O21.9 NAUSEA AND VOMITING DURING PREGNANCY: ICD-10-CM

## 2020-07-02 LAB — MULTISTIX LOT#: NORMAL NUMERIC

## 2020-07-02 PROCEDURE — 81002 URINALYSIS NONAUTO W/O SCOPE: CPT | Performed by: OBSTETRICS & GYNECOLOGY

## 2020-07-02 PROCEDURE — 59025 FETAL NON-STRESS TEST: CPT | Performed by: OBSTETRICS & GYNECOLOGY

## 2020-07-02 NOTE — PROGRESS NOTES
Pt given arrival instructions. RN reviewed induction procedures, general plan of care, and  pain medication options. covid-travel screening done. Questions answered. Pt verbalizes understanding.

## 2020-07-02 NOTE — PROGRESS NOTES
ADENIKE    +FM x 2. Contractions still occasionally   No LOF or VB.    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 Desires TOLAC.  IOL scheduled for 7/6/2020

## 2020-07-03 ENCOUNTER — TELEPHONE (OUTPATIENT)
Dept: CASE MANAGEMENT | Facility: HOSPITAL | Age: 30
End: 2020-07-03

## 2020-07-03 NOTE — PROGRESS NOTES
spoke to Mitchell Sam, She will deliver twins on 7/6/2020 at [facility]. CM updated her that Marbin will be at delivery to assess twinsand determine if there are any care needs at that time. Merari Alejo stated that would be great.  Merari Alejo had no o

## 2020-07-05 ENCOUNTER — APPOINTMENT (OUTPATIENT)
Dept: ULTRASOUND IMAGING | Facility: HOSPITAL | Age: 30
End: 2020-07-05
Attending: OBSTETRICS & GYNECOLOGY
Payer: COMMERCIAL

## 2020-07-05 ENCOUNTER — HOSPITAL ENCOUNTER (OUTPATIENT)
Facility: HOSPITAL | Age: 30
Setting detail: OBSERVATION
Discharge: HOME | End: 2020-07-05
Attending: OBSTETRICS & GYNECOLOGY | Admitting: OBSTETRICS & GYNECOLOGY
Payer: COMMERCIAL

## 2020-07-05 VITALS
RESPIRATION RATE: 16 BRPM | TEMPERATURE: 98 F | DIASTOLIC BLOOD PRESSURE: 77 MMHG | HEART RATE: 75 BPM | SYSTOLIC BLOOD PRESSURE: 121 MMHG

## 2020-07-05 PROCEDURE — 99213 OFFICE O/P EST LOW 20 MIN: CPT

## 2020-07-05 PROCEDURE — 59025 FETAL NON-STRESS TEST: CPT

## 2020-07-05 PROCEDURE — 93971 EXTREMITY STUDY: CPT | Performed by: OBSTETRICS & GYNECOLOGY

## 2020-07-05 NOTE — PROGRESS NOTES
Pt is a 30year old female admitted to [facility], Pt is 38w1d intra-uterine pregnancy. Denies any leaking of fluid, vaginal bleeding, or uterine contractions. Reports +fetal movement.  Patient states since yesterday she has noticed increased swelling in h

## 2020-07-05 NOTE — PROGRESS NOTES
Pt discharged home in stable condition. Patient declines wheelchair. Patient escorted by spouse. Discharge instructions given. All questions answered. Patient verbalized understanding.

## 2020-07-05 NOTE — NST
Nonstress Test   Patient: Milderd Squibb    Gestation: 38w1d    NST:       Variability: Moderate    Variability - Fetus B: Moderate      Accelerations: Yes    Accelerations -  Fetus B: Yes      Decelerations: None     Decelerations - Fetus B: None      Bas

## 2020-07-06 ENCOUNTER — APPOINTMENT (OUTPATIENT)
Dept: OBGYN CLINIC | Facility: HOSPITAL | Age: 30
End: 2020-07-06
Payer: COMMERCIAL

## 2020-07-06 ENCOUNTER — HOSPITAL ENCOUNTER (INPATIENT)
Facility: HOSPITAL | Age: 30
LOS: 3 days | Discharge: HOME | End: 2020-07-09
Attending: OBSTETRICS & GYNECOLOGY | Admitting: OBSTETRICS & GYNECOLOGY
Payer: COMMERCIAL

## 2020-07-06 ENCOUNTER — ANESTHESIA EVENT (OUTPATIENT)
Dept: OBGYN UNIT | Facility: HOSPITAL | Age: 30
End: 2020-07-06
Payer: COMMERCIAL

## 2020-07-06 ENCOUNTER — ANESTHESIA (OUTPATIENT)
Dept: OBGYN UNIT | Facility: HOSPITAL | Age: 30
End: 2020-07-06
Payer: COMMERCIAL

## 2020-07-06 LAB
ANTIBODY SCREEN: NEGATIVE
BASOPHILS # BLD AUTO: 0.02 X10(3) UL (ref 0–0.2)
BASOPHILS NFR BLD AUTO: 0.2 %
DEPRECATED RDW RBC AUTO: 45.6 FL (ref 35.1–46.3)
EOSINOPHIL # BLD AUTO: 0.13 X10(3) UL (ref 0–0.7)
EOSINOPHIL NFR BLD AUTO: 1 %
ERYTHROCYTE [DISTWIDTH] IN BLOOD BY AUTOMATED COUNT: 13.9 % (ref 11–15)
HCT VFR BLD AUTO: 31.8 % (ref 35–48)
HGB BLD-MCNC: 10.6 G/DL (ref 12–16)
IMM GRANULOCYTES # BLD AUTO: 0.12 X10(3) UL (ref 0–1)
IMM GRANULOCYTES NFR BLD: 0.9 %
LYMPHOCYTES # BLD AUTO: 3.41 X10(3) UL (ref 1–4)
LYMPHOCYTES NFR BLD AUTO: 26.9 %
MCH RBC QN AUTO: 29.7 PG (ref 26–34)
MCHC RBC AUTO-ENTMCNC: 33.3 G/DL (ref 31–37)
MCV RBC AUTO: 89.1 FL (ref 80–100)
MONOCYTES # BLD AUTO: 1.03 X10(3) UL (ref 0.1–1)
MONOCYTES NFR BLD AUTO: 8.1 %
NEUTROPHILS # BLD AUTO: 7.95 X10 (3) UL (ref 1.5–7.7)
NEUTROPHILS # BLD AUTO: 7.95 X10(3) UL (ref 1.5–7.7)
NEUTROPHILS NFR BLD AUTO: 62.9 %
PLATELET # BLD AUTO: 288 10(3)UL (ref 150–450)
RBC # BLD AUTO: 3.57 X10(6)UL (ref 3.8–5.3)
RH BLOOD TYPE: POSITIVE
SARS-COV-2 RNA RESP QL NAA+PROBE: NOT DETECTED
T PALLIDUM AB SER QL IA: NONREACTIVE
WBC # BLD AUTO: 12.7 X10(3) UL (ref 4–11)

## 2020-07-06 PROCEDURE — 59510 CESAREAN DELIVERY: CPT | Performed by: OBSTETRICS & GYNECOLOGY

## 2020-07-06 PROCEDURE — 3E033VJ INTRODUCTION OF OTHER HORMONE INTO PERIPHERAL VEIN, PERCUTANEOUS APPROACH: ICD-10-PCS | Performed by: OBSTETRICS & GYNECOLOGY

## 2020-07-06 RX ORDER — METHYLERGONOVINE MALEATE 0.2 MG/ML
INJECTION INTRAVENOUS
Status: DISPENSED
Start: 2020-07-06 | End: 2020-07-07

## 2020-07-06 RX ORDER — DEXTROSE, SODIUM CHLORIDE, SODIUM LACTATE, POTASSIUM CHLORIDE, AND CALCIUM CHLORIDE 5; .6; .31; .03; .02 G/100ML; G/100ML; G/100ML; G/100ML; G/100ML
INJECTION, SOLUTION INTRAVENOUS AS NEEDED
Status: DISCONTINUED | OUTPATIENT
Start: 2020-07-06 | End: 2020-07-06 | Stop reason: HOSPADM

## 2020-07-06 RX ORDER — HYDROCODONE BITARTRATE AND ACETAMINOPHEN 5; 325 MG/1; MG/1
1 TABLET ORAL EVERY 4 HOURS PRN
Status: DISCONTINUED | OUTPATIENT
Start: 2020-07-06 | End: 2020-07-09

## 2020-07-06 RX ORDER — EPHEDRINE SULFATE/0.9% NACL/PF 25 MG/5 ML
5 SYRINGE (ML) INTRAVENOUS AS NEEDED
Status: DISCONTINUED | OUTPATIENT
Start: 2020-07-06 | End: 2020-07-09

## 2020-07-06 RX ORDER — GABAPENTIN 300 MG/1
300 CAPSULE ORAL EVERY 8 HOURS PRN
Status: DISCONTINUED | OUTPATIENT
Start: 2020-07-06 | End: 2020-07-09

## 2020-07-06 RX ORDER — ACETAMINOPHEN 500 MG
500 TABLET ORAL EVERY 6 HOURS PRN
Status: DISCONTINUED | OUTPATIENT
Start: 2020-07-06 | End: 2020-07-06 | Stop reason: HOSPADM

## 2020-07-06 RX ORDER — CLINDAMYCIN PHOSPHATE 900 MG/50ML
INJECTION INTRAVENOUS
Status: DISPENSED
Start: 2020-07-06 | End: 2020-07-07

## 2020-07-06 RX ORDER — ONDANSETRON 2 MG/ML
INJECTION INTRAMUSCULAR; INTRAVENOUS AS NEEDED
Status: DISCONTINUED | OUTPATIENT
Start: 2020-07-06 | End: 2020-07-06 | Stop reason: SURG

## 2020-07-06 RX ORDER — TRANEXAMIC ACID 10 MG/ML
1000 INJECTION, SOLUTION INTRAVENOUS ONCE
Status: DISCONTINUED | OUTPATIENT
Start: 2020-07-06 | End: 2020-07-06

## 2020-07-06 RX ORDER — IBUPROFEN 600 MG/1
600 TABLET ORAL EVERY 6 HOURS PRN
Status: DISCONTINUED | OUTPATIENT
Start: 2020-07-06 | End: 2020-07-06 | Stop reason: HOSPADM

## 2020-07-06 RX ORDER — ONDANSETRON 2 MG/ML
4 INJECTION INTRAMUSCULAR; INTRAVENOUS EVERY 6 HOURS PRN
Status: DISCONTINUED | OUTPATIENT
Start: 2020-07-06 | End: 2020-07-06

## 2020-07-06 RX ORDER — AMMONIA INHALANTS 0.04 G/.3ML
0.3 INHALANT RESPIRATORY (INHALATION) AS NEEDED
Status: DISCONTINUED | OUTPATIENT
Start: 2020-07-06 | End: 2020-07-06 | Stop reason: HOSPADM

## 2020-07-06 RX ORDER — TRISODIUM CITRATE DIHYDRATE AND CITRIC ACID MONOHYDRATE 500; 334 MG/5ML; MG/5ML
30 SOLUTION ORAL AS NEEDED
Status: COMPLETED | OUTPATIENT
Start: 2020-07-06 | End: 2020-07-06

## 2020-07-06 RX ORDER — DIPHENHYDRAMINE HCL 25 MG
25 CAPSULE ORAL EVERY 4 HOURS PRN
Status: DISCONTINUED | OUTPATIENT
Start: 2020-07-06 | End: 2020-07-09

## 2020-07-06 RX ORDER — HYDROCODONE BITARTRATE AND ACETAMINOPHEN 10; 325 MG/1; MG/1
1 TABLET ORAL EVERY 4 HOURS PRN
Status: DISCONTINUED | OUTPATIENT
Start: 2020-07-06 | End: 2020-07-09

## 2020-07-06 RX ORDER — MORPHINE SULFATE 0.5 MG/ML
4 INJECTION, SOLUTION EPIDURAL; INTRATHECAL; INTRAVENOUS ONCE
Status: DISCONTINUED | OUTPATIENT
Start: 2020-07-06 | End: 2020-07-09

## 2020-07-06 RX ORDER — TERBUTALINE SULFATE 1 MG/ML
0.25 INJECTION, SOLUTION SUBCUTANEOUS AS NEEDED
Status: DISCONTINUED | OUTPATIENT
Start: 2020-07-06 | End: 2020-07-06 | Stop reason: HOSPADM

## 2020-07-06 RX ORDER — MEPERIDINE HYDROCHLORIDE 25 MG/ML
12.5 INJECTION INTRAMUSCULAR; INTRAVENOUS; SUBCUTANEOUS ONCE
Status: CANCELLED | OUTPATIENT
Start: 2020-07-06 | End: 2020-07-06

## 2020-07-06 RX ORDER — METHYLERGONOVINE MALEATE 0.2 MG/ML
0.2 INJECTION INTRAVENOUS ONCE
Status: DISCONTINUED | OUTPATIENT
Start: 2020-07-06 | End: 2020-07-09

## 2020-07-06 RX ORDER — CEFAZOLIN SODIUM/WATER 2 G/20 ML
SYRINGE (ML) INTRAVENOUS
Status: DISCONTINUED
Start: 2020-07-06 | End: 2020-07-06 | Stop reason: WASHOUT

## 2020-07-06 RX ORDER — SODIUM CHLORIDE, SODIUM LACTATE, POTASSIUM CHLORIDE, CALCIUM CHLORIDE 600; 310; 30; 20 MG/100ML; MG/100ML; MG/100ML; MG/100ML
INJECTION, SOLUTION INTRAVENOUS CONTINUOUS
Status: DISCONTINUED | OUTPATIENT
Start: 2020-07-06 | End: 2020-07-09

## 2020-07-06 RX ORDER — SODIUM CHLORIDE, SODIUM LACTATE, POTASSIUM CHLORIDE, CALCIUM CHLORIDE 600; 310; 30; 20 MG/100ML; MG/100ML; MG/100ML; MG/100ML
INJECTION, SOLUTION INTRAVENOUS CONTINUOUS
Status: DISCONTINUED | OUTPATIENT
Start: 2020-07-06 | End: 2020-07-06 | Stop reason: HOSPADM

## 2020-07-06 RX ORDER — METOCLOPRAMIDE HYDROCHLORIDE 5 MG/ML
INJECTION INTRAMUSCULAR; INTRAVENOUS AS NEEDED
Status: DISCONTINUED | OUTPATIENT
Start: 2020-07-06 | End: 2020-07-06 | Stop reason: SURG

## 2020-07-06 RX ORDER — CLINDAMYCIN PHOSPHATE 900 MG/50ML
900 INJECTION INTRAVENOUS ONCE
Status: COMPLETED | OUTPATIENT
Start: 2020-07-06 | End: 2020-07-06

## 2020-07-06 RX ORDER — FLUOXETINE HYDROCHLORIDE 20 MG/1
40 CAPSULE ORAL DAILY
Status: DISCONTINUED | OUTPATIENT
Start: 2020-07-07 | End: 2020-07-09

## 2020-07-06 RX ORDER — GENTAMICIN SULFATE 40 MG/ML
INJECTION, SOLUTION INTRAMUSCULAR; INTRAVENOUS
Status: DISPENSED
Start: 2020-07-06 | End: 2020-07-07

## 2020-07-06 RX ORDER — TRANEXAMIC ACID 10 MG/ML
1000 INJECTION, SOLUTION INTRAVENOUS ONCE
Status: COMPLETED | OUTPATIENT
Start: 2020-07-06 | End: 2020-07-06

## 2020-07-06 RX ORDER — NALOXONE HYDROCHLORIDE 0.4 MG/ML
0.08 INJECTION, SOLUTION INTRAMUSCULAR; INTRAVENOUS; SUBCUTANEOUS
Status: ACTIVE | OUTPATIENT
Start: 2020-07-06 | End: 2020-07-07

## 2020-07-06 RX ORDER — LIDOCAINE HYDROCHLORIDE AND EPINEPHRINE 15; 5 MG/ML; UG/ML
INJECTION, SOLUTION EPIDURAL AS NEEDED
Status: DISCONTINUED | OUTPATIENT
Start: 2020-07-06 | End: 2020-07-06 | Stop reason: SURG

## 2020-07-06 RX ORDER — KETOROLAC TROMETHAMINE 30 MG/ML
30 INJECTION, SOLUTION INTRAMUSCULAR; INTRAVENOUS ONCE AS NEEDED
Status: COMPLETED | OUTPATIENT
Start: 2020-07-06 | End: 2020-07-06

## 2020-07-06 RX ORDER — IBUPROFEN 600 MG/1
600 TABLET ORAL 4 TIMES DAILY
Status: DISCONTINUED | OUTPATIENT
Start: 2020-07-06 | End: 2020-07-06

## 2020-07-06 RX ORDER — MORPHINE SULFATE 4 MG/ML
2 INJECTION, SOLUTION INTRAMUSCULAR; INTRAVENOUS EVERY 2 HOUR PRN
Status: ACTIVE | OUTPATIENT
Start: 2020-07-06 | End: 2020-07-07

## 2020-07-06 RX ORDER — ZOLPIDEM TARTRATE 5 MG/1
5 TABLET ORAL NIGHTLY PRN
Status: DISCONTINUED | OUTPATIENT
Start: 2020-07-06 | End: 2020-07-09

## 2020-07-06 RX ORDER — DIPHENHYDRAMINE HYDROCHLORIDE 50 MG/ML
25 INJECTION INTRAMUSCULAR; INTRAVENOUS ONCE AS NEEDED
Status: DISCONTINUED | OUTPATIENT
Start: 2020-07-06 | End: 2020-07-06 | Stop reason: HOSPADM

## 2020-07-06 RX ORDER — DIPHENHYDRAMINE HYDROCHLORIDE 50 MG/ML
12.5 INJECTION INTRAMUSCULAR; INTRAVENOUS EVERY 4 HOURS PRN
Status: DISCONTINUED | OUTPATIENT
Start: 2020-07-06 | End: 2020-07-09

## 2020-07-06 RX ORDER — KETOROLAC TROMETHAMINE 30 MG/ML
INJECTION, SOLUTION INTRAMUSCULAR; INTRAVENOUS
Status: COMPLETED
Start: 2020-07-06 | End: 2020-07-06

## 2020-07-06 RX ORDER — IBUPROFEN 600 MG/1
600 TABLET ORAL 4 TIMES DAILY
Status: DISCONTINUED | OUTPATIENT
Start: 2020-07-07 | End: 2020-07-09

## 2020-07-06 RX ORDER — KETOROLAC TROMETHAMINE 30 MG/ML
30 INJECTION, SOLUTION INTRAMUSCULAR; INTRAVENOUS EVERY 6 HOURS
Status: DISPENSED | OUTPATIENT
Start: 2020-07-06 | End: 2020-07-07

## 2020-07-06 RX ORDER — GENTAMICIN SULFATE 40 MG/ML
5 INJECTION, SOLUTION INTRAMUSCULAR; INTRAVENOUS ONCE
Status: COMPLETED | OUTPATIENT
Start: 2020-07-06 | End: 2020-07-06

## 2020-07-06 RX ORDER — MORPHINE SULFATE 4 MG/ML
2 INJECTION, SOLUTION INTRAMUSCULAR; INTRAVENOUS EVERY 5 MIN PRN
Status: DISCONTINUED | OUTPATIENT
Start: 2020-07-06 | End: 2020-07-06 | Stop reason: HOSPADM

## 2020-07-06 RX ORDER — IBUPROFEN 600 MG/1
600 TABLET ORAL EVERY 6 HOURS SCHEDULED
Status: DISCONTINUED | OUTPATIENT
Start: 2020-07-07 | End: 2020-07-09

## 2020-07-06 RX ORDER — PHENYLEPHRINE HCL 10 MG/ML
VIAL (ML) INJECTION AS NEEDED
Status: DISCONTINUED | OUTPATIENT
Start: 2020-07-06 | End: 2020-07-06 | Stop reason: SURG

## 2020-07-06 RX ORDER — ONDANSETRON 2 MG/ML
4 INJECTION INTRAMUSCULAR; INTRAVENOUS EVERY 6 HOURS PRN
Status: DISCONTINUED | OUTPATIENT
Start: 2020-07-06 | End: 2020-07-09

## 2020-07-06 RX ORDER — ONDANSETRON 2 MG/ML
4 INJECTION INTRAMUSCULAR; INTRAVENOUS ONCE AS NEEDED
Status: DISCONTINUED | OUTPATIENT
Start: 2020-07-06 | End: 2020-07-06 | Stop reason: HOSPADM

## 2020-07-06 RX ORDER — SODIUM CHLORIDE 9 MG/ML
100 INJECTION, SOLUTION INTRAVENOUS ONCE
Status: DISCONTINUED | OUTPATIENT
Start: 2020-07-06 | End: 2020-07-06 | Stop reason: HOSPADM

## 2020-07-06 RX ORDER — METHYLERGONOVINE MALEATE 0.2 MG/ML
INJECTION INTRAVENOUS AS NEEDED
Status: DISCONTINUED | OUTPATIENT
Start: 2020-07-06 | End: 2020-07-06 | Stop reason: SURG

## 2020-07-06 RX ORDER — DEXAMETHASONE SODIUM PHOSPHATE 4 MG/ML
VIAL (ML) INJECTION AS NEEDED
Status: DISCONTINUED | OUTPATIENT
Start: 2020-07-06 | End: 2020-07-06 | Stop reason: SURG

## 2020-07-06 RX ORDER — ACETAMINOPHEN 500 MG
1000 TABLET ORAL EVERY 6 HOURS
Status: DISCONTINUED | OUTPATIENT
Start: 2020-07-06 | End: 2020-07-09

## 2020-07-06 RX ORDER — LIDOCAINE HYDROCHLORIDE AND EPINEPHRINE 20; 5 MG/ML; UG/ML
INJECTION, SOLUTION EPIDURAL; INFILTRATION; INTRACAUDAL; PERINEURAL AS NEEDED
Status: DISCONTINUED | OUTPATIENT
Start: 2020-07-06 | End: 2020-07-06 | Stop reason: SURG

## 2020-07-06 RX ORDER — METOCLOPRAMIDE HYDROCHLORIDE 5 MG/ML
10 INJECTION INTRAMUSCULAR; INTRAVENOUS EVERY 6 HOURS PRN
Status: DISCONTINUED | OUTPATIENT
Start: 2020-07-06 | End: 2020-07-09

## 2020-07-06 RX ORDER — SIMETHICONE 80 MG
80 TABLET,CHEWABLE ORAL 3 TIMES DAILY PRN
Status: DISCONTINUED | OUTPATIENT
Start: 2020-07-06 | End: 2020-07-09

## 2020-07-06 RX ORDER — DOCUSATE SODIUM 100 MG/1
100 CAPSULE, LIQUID FILLED ORAL
Status: DISCONTINUED | OUTPATIENT
Start: 2020-07-07 | End: 2020-07-09

## 2020-07-06 RX ORDER — MORPHINE SULFATE 2 MG/ML
INJECTION, SOLUTION INTRAMUSCULAR; INTRAVENOUS AS NEEDED
Status: DISCONTINUED | OUTPATIENT
Start: 2020-07-06 | End: 2020-07-06 | Stop reason: SURG

## 2020-07-06 RX ORDER — MIDAZOLAM HYDROCHLORIDE 1 MG/ML
INJECTION INTRAMUSCULAR; INTRAVENOUS AS NEEDED
Status: DISCONTINUED | OUTPATIENT
Start: 2020-07-06 | End: 2020-07-06 | Stop reason: SURG

## 2020-07-06 RX ORDER — TRANEXAMIC ACID 10 MG/ML
1000 INJECTION, SOLUTION INTRAVENOUS ONCE AS NEEDED
Status: DISPENSED | OUTPATIENT
Start: 2020-07-06 | End: 2020-07-06

## 2020-07-06 RX ADMIN — DEXAMETHASONE SODIUM PHOSPHATE 8 MG: 4 MG/ML VIAL (ML) INJECTION at 18:29:00

## 2020-07-06 RX ADMIN — PHENYLEPHRINE HCL 100 MCG: 10 MG/ML VIAL (ML) INJECTION at 19:03:00

## 2020-07-06 RX ADMIN — LIDOCAINE HYDROCHLORIDE AND EPINEPHRINE 5 ML: 15; 5 INJECTION, SOLUTION EPIDURAL at 18:32:00

## 2020-07-06 RX ADMIN — SODIUM CHLORIDE, SODIUM LACTATE, POTASSIUM CHLORIDE, CALCIUM CHLORIDE: 600; 310; 30; 20 INJECTION, SOLUTION INTRAVENOUS at 19:16:00

## 2020-07-06 RX ADMIN — CLINDAMYCIN PHOSPHATE 900 MG: 900 INJECTION INTRAVENOUS at 18:22:00

## 2020-07-06 RX ADMIN — ONDANSETRON 4 MG: 2 INJECTION INTRAMUSCULAR; INTRAVENOUS at 18:30:00

## 2020-07-06 RX ADMIN — METOCLOPRAMIDE HYDROCHLORIDE 20 MG: 5 INJECTION INTRAMUSCULAR; INTRAVENOUS at 19:03:00

## 2020-07-06 RX ADMIN — MORPHINE SULFATE 4 MG: 2 INJECTION, SOLUTION INTRAMUSCULAR; INTRAVENOUS at 18:50:00

## 2020-07-06 RX ADMIN — LIDOCAINE HYDROCHLORIDE AND EPINEPHRINE 5 ML: 20; 5 INJECTION, SOLUTION EPIDURAL; INFILTRATION; INTRACAUDAL; PERINEURAL at 18:20:00

## 2020-07-06 RX ADMIN — METHYLERGONOVINE MALEATE 0.2 MG: 0.2 INJECTION INTRAVENOUS at 18:44:00

## 2020-07-06 RX ADMIN — MIDAZOLAM HYDROCHLORIDE 1 MG: 1 INJECTION INTRAMUSCULAR; INTRAVENOUS at 18:37:00

## 2020-07-06 RX ADMIN — GENTAMICIN SULFATE 480 MG: 40 INJECTION, SOLUTION INTRAMUSCULAR; INTRAVENOUS at 18:26:00

## 2020-07-06 RX ADMIN — LIDOCAINE HYDROCHLORIDE AND EPINEPHRINE 10 ML: 20; 5 INJECTION, SOLUTION EPIDURAL; INFILTRATION; INTRACAUDAL; PERINEURAL at 18:21:00

## 2020-07-06 NOTE — H&P
[address] Patient Status:  Inpatient    1990 MRN [id number]   Location 1818 [facility] Attending Perla Smith MD   Hosp Day # 0 PCP Matty Sullivan     Subjective:  Dorothy Gillette is a 30 Anticipate vaginal delivery.

## 2020-07-06 NOTE — ANESTHESIA PREPROCEDURE EVALUATION
PRE-OP EVALUATION    Patient Name: Le Alexis    Pre-op Diagnosis: * Twins Labor *    * Labor Epidural *    * No surgeons found in log *    Pre-op vitals reviewed.   Temp: 96.8 °F (36 °C)  Pulse: 72  Resp: 16  BP: 120/68  SpO2: 99 %  Body mass index is ACID) Oral Tab, Take by mouth., Disp: , Rfl:         Allergies: Penicillins      Anesthesia Evaluation    Patient summary reviewed.     Anesthetic Complications  (-) history of anesthetic complications         GI/Hepatic/Renal    Negative GI/hepatic/renal R ASA: 2   Plan: epidural  NPO status verified and Patient does not meet NPO guidelines. Patient has not taken beta blockers in last 24 hours. Post-procedure pain management plan discussed with surgeon and patient.     Comment: The risks and benefi

## 2020-07-06 NOTE — ANESTHESIA PROCEDURE NOTES
Labor Analgesia  Performed by: Chyna Camargo MD  Authorized by: Chyna Camargo MD       General Information and Staff    Start Time:  7/6/2020 11:18 AM  End Time:  7/6/2020 11:33 AM  Anesthesiologist:  Chyna Cmaargo MD  Performed by:   Anesthe

## 2020-07-06 NOTE — PROGRESS NOTES
Pt is a 30year old female admitted to 115/115[facility]A. Patient presents with:  Scheduled Induction: Pt here for elective Induction of labor, Twins. Pt is  38w2d intra-uterine pregnancy. History obtained, consents signed.  Oriented to room, staff,

## 2020-07-07 PROBLEM — Z98.891 STATUS POST REPEAT LOW TRANSVERSE CESAREAN SECTION: Status: ACTIVE | Noted: 2020-07-07

## 2020-07-07 LAB
BASOPHILS # BLD AUTO: 0.02 X10(3) UL (ref 0–0.2)
BASOPHILS NFR BLD AUTO: 0.1 %
DEPRECATED RDW RBC AUTO: 45.7 FL (ref 35.1–46.3)
EOSINOPHIL # BLD AUTO: 0 X10(3) UL (ref 0–0.7)
EOSINOPHIL NFR BLD AUTO: 0 %
ERYTHROCYTE [DISTWIDTH] IN BLOOD BY AUTOMATED COUNT: 14 % (ref 11–15)
HCT VFR BLD AUTO: 28 % (ref 35–48)
HGB BLD-MCNC: 9.2 G/DL (ref 12–16)
IMM GRANULOCYTES # BLD AUTO: 0.19 X10(3) UL (ref 0–1)
IMM GRANULOCYTES NFR BLD: 0.7 %
LYMPHOCYTES # BLD AUTO: 2.13 X10(3) UL (ref 1–4)
LYMPHOCYTES NFR BLD AUTO: 8.2 %
MCH RBC QN AUTO: 29.4 PG (ref 26–34)
MCHC RBC AUTO-ENTMCNC: 32.9 G/DL (ref 31–37)
MCV RBC AUTO: 89.5 FL (ref 80–100)
MONOCYTES # BLD AUTO: 2.1 X10(3) UL (ref 0.1–1)
MONOCYTES NFR BLD AUTO: 8 %
NEUTROPHILS # BLD AUTO: 21.66 X10 (3) UL (ref 1.5–7.7)
NEUTROPHILS # BLD AUTO: 21.66 X10(3) UL (ref 1.5–7.7)
NEUTROPHILS NFR BLD AUTO: 83 %
PLATELET # BLD AUTO: 276 10(3)UL (ref 150–450)
RBC # BLD AUTO: 3.13 X10(6)UL (ref 3.8–5.3)
WBC # BLD AUTO: 26.1 X10(3) UL (ref 4–11)

## 2020-07-07 NOTE — PROGRESS NOTES
NURSING ADMISSION NOTE      Patient admitted via Cart  Oriented to room. Safety precautions initiated. Bed in low position. Call light in reach.   Identification of baby verified with parents and RNx2  Hugs/kisses in place  Discharge folder at [facility]

## 2020-07-07 NOTE — PLAN OF CARE
Problem: BIRTH - VAGINAL/ SECTION  Goal: Fetal and maternal status remain reassuring during the birth process  Description  INTERVENTIONS:  - Monitor vital signs  - Monitor fetal heart rate  - Monitor uterine activity  - Monitor labor progression course  Description  INTERVENTIONS:  - Assess and monitor vital signs and lab values. - Assess fundus and lochia. - Provide ice/sitz baths for perineum discomfort.   - Monitor healing of incision/episiotomy/laceration, and assess for signs and symptoms of techniques for milk expression (breast pumping) and storage of breast milk. Provide pumping equipment/supplies, instructions and assistance, as needed. - Encourage rooming-in and breast feeding on demand.  - Encourage skin-to-skin contact.   - Provide LC s or PO as ordered and tolerated  - Evaluate effectiveness of GI medications  - Encourage mobilization and activity  - Obtain nutritional consult as needed  - Establish a toileting routine/schedule  - Consider collaborating with pharmacy to review patient's

## 2020-07-07 NOTE — PROGRESS NOTES
[facility] 2SW-J robin Hyatt Patient Status:  Inpatient   Age/Gender 30year old female MRN [id number]   [facility] 2SW-J Attending Zurdo Mcleod MD   [facility] Day # 1 PCP Patty Canas      Anesthesia Pain Progress Note

## 2020-07-07 NOTE — PROGRESS NOTES
[facility]   Section - Operative Note    Tiffani Moat Patient Status:  Inpatient    1990 MRN [id number]   Location 1818 [facility] Attending Naye Carter MD   Hosp Day # 0 PCP 40 [facility]       Preoperative Diagn hysterotomy, peritomeum and rectus muscles noted them to be entirely hemostatic. Anika Ritter The fascia was closed with 0 vicryl in a running fashion. The subcutaneous tissue was copiously irrigated and any bleeding cauterized.   The subcutaneous tissue was closed

## 2020-07-07 NOTE — PROGRESS NOTES
Tiffanie care completed, pt tolerated well. Gown changed. Pt transferred to room 2194 in stable condition, holding baby A.  Baby B in nicu at this time, Dr Emigdio Hernandez at bedside at 2115 to brief parents on infant status.   Bands of baby A verified on arrival to [facility]

## 2020-07-07 NOTE — PLAN OF CARE
Problem: BIRTH - VAGINAL/ SECTION  Goal: Fetal and maternal status remain reassuring during the birth process  Description  INTERVENTIONS:  - Monitor vital signs  - Monitor fetal heart rate  - Monitor uterine activity  - Monitor labor progression for signs and symptoms of infection and hematoma. - Assess bladder function and monitor for bladder distention.  - Provide/instruct/assist with pericare as needed. - Provide VTE prophylaxis as needed.   - Monitor bowel function.  - Encourage ambulation an contact. - Provide LC support as needed. - Assess for and manage engorgement. - Provide breast feeding education handouts and information on community breast feeding support.    Outcome: Progressing  Goal: Establishment of adequate milk supply with medic

## 2020-07-07 NOTE — PROGRESS NOTES
POD#1    Pt has no complaints, lochia min   07/07/20  0720   BP:    Pulse:    Resp: 17   Temp: 98.2 °F (36.8 °C)     Recent Labs   Lab 07/06/20  0717 07/07/20  0650   RBC 3.57* 3.13*   HGB 10.6* 9.2*   HCT 31.8* 28.0*   MCV 89.1 89.5   MCH 29.7 29.4   MCHC

## 2020-07-07 NOTE — ANESTHESIA POSTPROCEDURE EVALUATION
[id number] Patient Status:  Inpatient   Age/Gender 30year old female MRN [id number]   Location 1818 [facility] Attending Paulette Menezes MD   [facility] Day # 0 PCP 40 [facility]       Anesthesia Post-op Note    Procedure(

## 2020-07-08 RX ORDER — HYDROCODONE BITARTRATE AND ACETAMINOPHEN 5; 325 MG/1; MG/1
1 TABLET ORAL EVERY 6 HOURS PRN
Qty: 10 TABLET | Refills: 0 | Status: SHIPPED | OUTPATIENT
Start: 2020-07-08

## 2020-07-08 NOTE — PLAN OF CARE
Problem: PAIN - ADULT  Goal: Verbalizes/displays adequate comfort level or patient's stated pain goal  Description  INTERVENTIONS:  - Encourage pt to monitor pain and request assistance  - Assess pain using appropriate pain scale  - Administer analgesics VTE prophylaxis as needed. - Monitor bowel function.  - Encourage ambulation and provide assistance as needed. - Assess and monitor emotional status and provide social service/psych resources as needed.   - Utilize standard precautions and use personal pr support. Outcome: Progressing  Goal: Appropriate maternal -  bonding  Description  INTERVENTIONS:  - Assess caregiver- interactions. - Assess caregiver's emotional status and coping mechanisms.   - Encourage caregiver to participate in newb

## 2020-07-08 NOTE — PLAN OF CARE
Problem: POSTPARTUM  Goal: Long Term Goal:Experiences normal postpartum course  Description  INTERVENTIONS:  - Assess and monitor vital signs and lab values. - Assess fundus and lochia. - Provide ice/sitz baths for perineum discomfort.   - Monitor heali pain/trauma. - Instruct and provide assistance with proper latch. - Review techniques for milk expression (breast pumping) and storage of breast milk. Provide pumping equipment/supplies, instructions and assistance, as needed.   - Encourage rooming-in and evaluate response  - Implement non-pharmacological measures as appropriate and evaluate response  - Consider cultural and social influences on pain and pain management  - Manage/alleviate anxiety  - Utilize distraction and/or relaxation techniques  - Monit

## 2020-07-08 NOTE — PROGRESS NOTES
Date of admission:  2020  6:56 AM  Date of discharge:  2020  Admit diagnosis:  38w2d      Twins, previous C/s  Discharge diagnosis: Same     Status post  section  Hospital course:     Berto Pineda is a 30year old  at 36w4d, who pr

## 2020-07-09 VITALS
WEIGHT: 206 LBS | BODY MASS INDEX: 33.11 KG/M2 | TEMPERATURE: 97 F | HEART RATE: 68 BPM | DIASTOLIC BLOOD PRESSURE: 75 MMHG | OXYGEN SATURATION: 98 % | RESPIRATION RATE: 18 BRPM | HEIGHT: 66 IN | SYSTOLIC BLOOD PRESSURE: 119 MMHG

## 2020-07-09 NOTE — PROGRESS NOTES
OB progress note    A/P: 30year old  now POD#3 s/p RLTCS at 38w2d of di-di twins for fetal intolerance to labor. IVF pregnancy, Anemia during pregnancy. Patient received IV iron during her pregnancy and received 1 dose postpartum.  Twin B is in the

## 2020-07-09 NOTE — PLAN OF CARE
Problem: PAIN - ADULT  Goal: Verbalizes/displays adequate comfort level or patient's stated pain goal  Description  INTERVENTIONS:  - Encourage pt to monitor pain and request assistance  - Assess pain using appropriate pain scale  - Administer analgesics prophylaxis as needed. - Monitor bowel function.  - Encourage ambulation and provide assistance as needed. - Assess and monitor emotional status and provide social service/psych resources as needed.   - Utilize standard precautions and use personal protec Outcome: Completed  Goal: Appropriate maternal -  bonding  Description  INTERVENTIONS:  - Assess caregiver- interactions. - Assess caregiver's emotional status and coping mechanisms.   - Encourage caregiver to participate in  daily c

## 2020-07-09 NOTE — PROGRESS NOTES
DISCHARGE NOTE  Discharge & Follow-Up information reviewed with mom, no questions following. ID Bands checked and verified at bedside. HUGS and Kisses tags removed  Baby in: car seat   Pt. in wheelchair.    Escorted off unit by: pct

## 2020-07-10 NOTE — OPERATIVE REPORT
[facility]   Section - Operative Note    Mark Primrose Patient Status:  Inpatient    1990 MRN [id number]   [facility] 2SW-J Attending No att. providers found   Hosp Day # 3 PCP Dary PHILLIPS       Preoperative Diagnosis: reapproximated. .  The fascia was closed with 0 vicryl in a running fashion. The subcutaneous tissue was copiously irrigated and any bleeding cauterized. The subcutaneous tissue was closed using vicryl.  The skin was closed sub q The sponge and instrument

## 2020-07-11 ENCOUNTER — TELEPHONE (OUTPATIENT)
Dept: OBGYN UNIT | Facility: HOSPITAL | Age: 30
End: 2020-07-11

## 2020-07-13 ENCOUNTER — TELEPHONE (OUTPATIENT)
Dept: OBGYN CLINIC | Facility: CLINIC | Age: 30
End: 2020-07-13

## 2020-07-13 ENCOUNTER — OFFICE VISIT (OUTPATIENT)
Dept: OBGYN CLINIC | Facility: CLINIC | Age: 30
End: 2020-07-13
Payer: COMMERCIAL

## 2020-07-13 VITALS
WEIGHT: 171 LBS | HEIGHT: 66 IN | HEART RATE: 81 BPM | SYSTOLIC BLOOD PRESSURE: 132 MMHG | DIASTOLIC BLOOD PRESSURE: 76 MMHG | BODY MASS INDEX: 27.48 KG/M2

## 2020-07-13 DIAGNOSIS — O99.013 ANEMIA DURING PREGNANCY IN THIRD TRIMESTER: Primary | ICD-10-CM

## 2020-07-13 PROBLEM — Z98.891 STATUS POST REPEAT LOW TRANSVERSE CESAREAN SECTION: Status: RESOLVED | Noted: 2020-07-07 | Resolved: 2020-07-13

## 2020-07-13 PROBLEM — O09.293 HISTORY OF PRIOR PREGNANCY WITH SHORT CERVIX, CURRENTLY PREGNANT IN THIRD TRIMESTER: Status: RESOLVED | Noted: 2019-12-16 | Resolved: 2020-07-13

## 2020-07-13 PROBLEM — O99.343 MENTAL DISORDER AFFECTING PREGNANCY IN THIRD TRIMESTER: Status: RESOLVED | Noted: 2019-12-16 | Resolved: 2020-07-13

## 2020-07-13 PROBLEM — R12 HEARTBURN DURING PREGNANCY IN THIRD TRIMESTER: Status: RESOLVED | Noted: 2020-03-18 | Resolved: 2020-07-13

## 2020-07-13 PROBLEM — O26.893 HEARTBURN DURING PREGNANCY IN THIRD TRIMESTER: Status: RESOLVED | Noted: 2020-03-18 | Resolved: 2020-07-13

## 2020-07-13 PROBLEM — O30.049 DICHORIONIC DIAMNIOTIC TWIN PREGNANCY, ANTEPARTUM: Status: RESOLVED | Noted: 2019-12-29 | Resolved: 2020-07-13

## 2020-07-13 PROBLEM — O09.813 HIGH RISK PREGNANCY DUE TO ASSISTED REPRODUCTIVE TECHNOLOGY IN THIRD TRIMESTER: Status: RESOLVED | Noted: 2019-12-12 | Resolved: 2020-07-13

## 2020-07-13 PROBLEM — O99.810 ABNORMAL GLUCOSE TOLERANCE TEST (GTT) DURING PREGNANCY, ANTEPARTUM: Status: RESOLVED | Noted: 2020-04-29 | Resolved: 2020-07-13

## 2020-07-13 PROBLEM — O09.293 H/O MACROSOMIA IN INFANT IN PRIOR PREGNANCY, CURRENTLY PREGNANT, THIRD TRIMESTER: Status: RESOLVED | Noted: 2019-12-16 | Resolved: 2020-07-13

## 2020-07-13 PROCEDURE — 1111F DSCHRG MED/CURRENT MED MERGE: CPT | Performed by: OBSTETRICS & GYNECOLOGY

## 2020-07-13 NOTE — PROGRESS NOTES
She has a headache. She is pumping. She does not feel like she is going to faint. Had one IV iron at the hospital has had them before in the past.  Is also taking oral iron. Will check a CBC and get her over for an IV iron.

## 2020-07-13 NOTE — TELEPHONE ENCOUNTER
PC with patient. Aware [facility]. She can call to schedule.

## 2020-07-14 ENCOUNTER — TELEPHONE (OUTPATIENT)
Dept: OBGYN CLINIC | Facility: CLINIC | Age: 30
End: 2020-07-14

## 2020-07-14 NOTE — DISCHARGE SUMMARY
[facility]    Discharge Summary    Garret Smoke Patient Status:  Inpatient    1990 MRN [id number]   [facility] 2SW-J Attending No att. providers found   Hosp Day # 3 PCP Darrian Snow     Date of Admission: 2020    Date o none    Placenta: manual removal    Feeding Method: breast fed    Rh Immune Globulin Given: no    Rubella Vaccine Given: no        Discharge Plan:   Discharge Condition: Stable  Early Discharge:  NO    Discharge medications:  Discharge Medication List as o breastfeeding. · Vaginal bleeding for about 4-6 weeks that may be followed by a yellow or white discharge for a few more weeks. Your period will resume in approximately 6-8 weeks, unless you are breastfeeding.     · If you are bottle feeding, you may not work in 6-8 weeks  · Contact your obstetrician’s office, if you need a medical release. Driving  · Avoid driving for 1-2 weeks or sooner if not taking narcotics.     Follow-up Appointment with Your Obstetrician  · Call your obstetrician’s office today for increased pain.   Last taken at:   Next dose due at:      @ COLACE (Docusate Sodium)  100mg two times per day, as needed for stool softening   (once in am/once in pm)  Last taken at:   Next dose due at:      @ SIMETHICONE (Mylicon) Chewable Tab   80mg daily

## 2020-07-14 NOTE — TELEPHONE ENCOUNTER
Per letter from [employer]. c/s has been approved. ref # [id number]. 07/06/20-07/10/2020.

## 2020-07-15 ENCOUNTER — TELEPHONE (OUTPATIENT)
Dept: OBGYN CLINIC | Facility: CLINIC | Age: 30
End: 2020-07-15

## 2020-07-15 NOTE — TELEPHONE ENCOUNTER
Zahira Escobedo is calling from [facility], he stated the frequency on the order for this patients IV iron infusion is not filled out. He does need that. Please advise.

## 2020-07-17 ENCOUNTER — OFFICE VISIT (OUTPATIENT)
Dept: HEMATOLOGY/ONCOLOGY | Age: 30
End: 2020-07-17
Attending: OBSTETRICS & GYNECOLOGY
Payer: COMMERCIAL

## 2020-07-17 VITALS
OXYGEN SATURATION: 99 % | TEMPERATURE: 99 F | SYSTOLIC BLOOD PRESSURE: 113 MMHG | RESPIRATION RATE: 18 BRPM | HEART RATE: 72 BPM | DIASTOLIC BLOOD PRESSURE: 70 MMHG

## 2020-07-17 DIAGNOSIS — O99.013 ANEMIA DURING PREGNANCY IN THIRD TRIMESTER: ICD-10-CM

## 2020-07-17 DIAGNOSIS — O99.019 IRON DEFICIENCY ANEMIA OF PREGNANCY: Primary | ICD-10-CM

## 2020-07-17 DIAGNOSIS — O21.9 NAUSEA AND VOMITING DURING PREGNANCY: ICD-10-CM

## 2020-07-17 DIAGNOSIS — D50.9 IRON DEFICIENCY ANEMIA OF PREGNANCY: Primary | ICD-10-CM

## 2020-07-17 DIAGNOSIS — E86.0 DEHYDRATION: ICD-10-CM

## 2020-07-17 LAB
BASOPHILS # BLD AUTO: 0.04 X10(3) UL (ref 0–0.2)
BASOPHILS NFR BLD AUTO: 0.4 %
DEPRECATED RDW RBC AUTO: 46.5 FL (ref 35.1–46.3)
EOSINOPHIL # BLD AUTO: 0.19 X10(3) UL (ref 0–0.7)
EOSINOPHIL NFR BLD AUTO: 1.7 %
ERYTHROCYTE [DISTWIDTH] IN BLOOD BY AUTOMATED COUNT: 14.5 % (ref 11–15)
HCT VFR BLD AUTO: 32.7 % (ref 35–48)
HGB BLD-MCNC: 10.7 G/DL (ref 12–16)
IMM GRANULOCYTES # BLD AUTO: 0.09 X10(3) UL (ref 0–1)
IMM GRANULOCYTES NFR BLD: 0.8 %
LYMPHOCYTES # BLD AUTO: 3.1 X10(3) UL (ref 1–4)
LYMPHOCYTES NFR BLD AUTO: 28.2 %
MCH RBC QN AUTO: 28.9 PG (ref 26–34)
MCHC RBC AUTO-ENTMCNC: 32.7 G/DL (ref 31–37)
MCV RBC AUTO: 88.4 FL (ref 80–100)
MONOCYTES # BLD AUTO: 0.58 X10(3) UL (ref 0.1–1)
MONOCYTES NFR BLD AUTO: 5.3 %
NEUTROPHILS # BLD AUTO: 7.01 X10 (3) UL (ref 1.5–7.7)
NEUTROPHILS # BLD AUTO: 7.01 X10(3) UL (ref 1.5–7.7)
NEUTROPHILS NFR BLD AUTO: 63.6 %
PLATELET # BLD AUTO: 488 10(3)UL (ref 150–450)
RBC # BLD AUTO: 3.7 X10(6)UL (ref 3.8–5.3)
WBC # BLD AUTO: 11 X10(3) UL (ref 4–11)

## 2020-07-17 PROCEDURE — 85025 COMPLETE CBC W/AUTO DIFF WBC: CPT

## 2020-07-17 PROCEDURE — 96374 THER/PROPH/DIAG INJ IV PUSH: CPT

## 2020-07-17 RX ORDER — DIPHENHYDRAMINE HYDROCHLORIDE 50 MG/ML
25 INJECTION INTRAMUSCULAR; INTRAVENOUS ONCE
Status: CANCELLED | OUTPATIENT
Start: 2020-07-24

## 2020-07-17 RX ORDER — METHYLPREDNISOLONE SODIUM SUCCINATE 40 MG/ML
40 INJECTION, POWDER, LYOPHILIZED, FOR SOLUTION INTRAMUSCULAR; INTRAVENOUS ONCE
Status: CANCELLED | OUTPATIENT
Start: 2020-07-24

## 2020-07-17 RX ORDER — METHYLPREDNISOLONE SODIUM SUCCINATE 125 MG/2ML
125 INJECTION, POWDER, LYOPHILIZED, FOR SOLUTION INTRAMUSCULAR; INTRAVENOUS ONCE
Status: CANCELLED | OUTPATIENT
Start: 2020-07-24

## 2020-07-17 RX ORDER — FAMOTIDINE 10 MG/ML
20 INJECTION, SOLUTION INTRAVENOUS ONCE
Status: CANCELLED | OUTPATIENT
Start: 2020-07-24

## 2020-07-17 RX ORDER — MEPERIDINE HYDROCHLORIDE 25 MG/ML
25 INJECTION INTRAMUSCULAR; INTRAVENOUS; SUBCUTANEOUS ONCE
Status: CANCELLED | OUTPATIENT
Start: 2020-07-24

## 2020-07-20 NOTE — PROGRESS NOTES
Patient aware of results and recommendations. Has second dose of IV iron on Friday 7/24. No longer to take po iron. Patient verbalized understanding.

## 2020-07-24 ENCOUNTER — OFFICE VISIT (OUTPATIENT)
Dept: HEMATOLOGY/ONCOLOGY | Age: 30
End: 2020-07-24
Attending: OBSTETRICS & GYNECOLOGY
Payer: COMMERCIAL

## 2020-07-24 VITALS
TEMPERATURE: 98 F | SYSTOLIC BLOOD PRESSURE: 112 MMHG | DIASTOLIC BLOOD PRESSURE: 70 MMHG | HEART RATE: 76 BPM | RESPIRATION RATE: 18 BRPM | OXYGEN SATURATION: 98 %

## 2020-07-24 DIAGNOSIS — O21.9 NAUSEA AND VOMITING DURING PREGNANCY: ICD-10-CM

## 2020-07-24 DIAGNOSIS — E86.0 DEHYDRATION: ICD-10-CM

## 2020-07-24 DIAGNOSIS — O99.019 IRON DEFICIENCY ANEMIA OF PREGNANCY: Primary | ICD-10-CM

## 2020-07-24 DIAGNOSIS — D50.9 IRON DEFICIENCY ANEMIA OF PREGNANCY: Primary | ICD-10-CM

## 2020-07-24 PROCEDURE — 96374 THER/PROPH/DIAG INJ IV PUSH: CPT

## 2020-07-24 RX ORDER — METHYLPREDNISOLONE SODIUM SUCCINATE 125 MG/2ML
125 INJECTION, POWDER, LYOPHILIZED, FOR SOLUTION INTRAMUSCULAR; INTRAVENOUS ONCE
Status: CANCELLED | OUTPATIENT
Start: 2020-07-31

## 2020-07-24 RX ORDER — DIPHENHYDRAMINE HYDROCHLORIDE 50 MG/ML
25 INJECTION INTRAMUSCULAR; INTRAVENOUS ONCE
Status: CANCELLED | OUTPATIENT
Start: 2020-07-31

## 2020-07-24 RX ORDER — METHYLPREDNISOLONE SODIUM SUCCINATE 40 MG/ML
40 INJECTION, POWDER, LYOPHILIZED, FOR SOLUTION INTRAMUSCULAR; INTRAVENOUS ONCE
Status: CANCELLED | OUTPATIENT
Start: 2020-07-31

## 2020-07-24 RX ORDER — FAMOTIDINE 10 MG/ML
20 INJECTION, SOLUTION INTRAVENOUS ONCE
Status: CANCELLED | OUTPATIENT
Start: 2020-07-31

## 2020-07-24 RX ORDER — MEPERIDINE HYDROCHLORIDE 25 MG/ML
25 INJECTION INTRAMUSCULAR; INTRAVENOUS; SUBCUTANEOUS ONCE
Status: CANCELLED | OUTPATIENT
Start: 2020-07-31

## 2020-07-24 NOTE — PROGRESS NOTES
Education Record    Learner:  Patient    Disease / Diagnosis: Venofer    Barriers / Limitations:  None   Comments:    Method:  Discussion   Comments:    General Topics:  Medication, Side effects and symptom management and Plan of care reviewed   Comments:

## 2020-07-31 ENCOUNTER — OFFICE VISIT (OUTPATIENT)
Dept: HEMATOLOGY/ONCOLOGY | Age: 30
End: 2020-07-31
Attending: OBSTETRICS & GYNECOLOGY
Payer: COMMERCIAL

## 2020-07-31 DIAGNOSIS — E86.0 DEHYDRATION: ICD-10-CM

## 2020-07-31 DIAGNOSIS — D50.9 IRON DEFICIENCY ANEMIA OF PREGNANCY: Primary | ICD-10-CM

## 2020-07-31 DIAGNOSIS — O99.019 IRON DEFICIENCY ANEMIA OF PREGNANCY: Primary | ICD-10-CM

## 2020-07-31 DIAGNOSIS — O21.9 NAUSEA AND VOMITING DURING PREGNANCY: ICD-10-CM

## 2020-07-31 PROCEDURE — 96374 THER/PROPH/DIAG INJ IV PUSH: CPT

## 2020-07-31 RX ORDER — FAMOTIDINE 10 MG/ML
20 INJECTION, SOLUTION INTRAVENOUS ONCE
Status: CANCELLED | OUTPATIENT
Start: 2020-08-07

## 2020-07-31 RX ORDER — MEPERIDINE HYDROCHLORIDE 25 MG/ML
25 INJECTION INTRAMUSCULAR; INTRAVENOUS; SUBCUTANEOUS ONCE
Status: CANCELLED | OUTPATIENT
Start: 2020-08-07

## 2020-07-31 RX ORDER — DIPHENHYDRAMINE HYDROCHLORIDE 50 MG/ML
25 INJECTION INTRAMUSCULAR; INTRAVENOUS ONCE
Status: CANCELLED | OUTPATIENT
Start: 2020-08-07

## 2020-07-31 RX ORDER — METHYLPREDNISOLONE SODIUM SUCCINATE 40 MG/ML
40 INJECTION, POWDER, LYOPHILIZED, FOR SOLUTION INTRAMUSCULAR; INTRAVENOUS ONCE
Status: CANCELLED | OUTPATIENT
Start: 2020-08-07

## 2020-07-31 RX ORDER — METHYLPREDNISOLONE SODIUM SUCCINATE 125 MG/2ML
125 INJECTION, POWDER, LYOPHILIZED, FOR SOLUTION INTRAMUSCULAR; INTRAVENOUS ONCE
Status: CANCELLED | OUTPATIENT
Start: 2020-08-07

## 2020-08-07 ENCOUNTER — APPOINTMENT (OUTPATIENT)
Dept: HEMATOLOGY/ONCOLOGY | Age: 30
End: 2020-08-07
Attending: OBSTETRICS & GYNECOLOGY
Payer: COMMERCIAL

## 2020-08-12 ENCOUNTER — OFFICE VISIT (OUTPATIENT)
Dept: HEMATOLOGY/ONCOLOGY | Age: 30
End: 2020-08-12
Attending: OBSTETRICS & GYNECOLOGY
Payer: COMMERCIAL

## 2020-08-12 VITALS
DIASTOLIC BLOOD PRESSURE: 76 MMHG | OXYGEN SATURATION: 99 % | SYSTOLIC BLOOD PRESSURE: 114 MMHG | RESPIRATION RATE: 16 BRPM | HEART RATE: 72 BPM | TEMPERATURE: 98 F

## 2020-08-12 DIAGNOSIS — D50.9 IRON DEFICIENCY ANEMIA OF PREGNANCY: Primary | ICD-10-CM

## 2020-08-12 DIAGNOSIS — E86.0 DEHYDRATION: ICD-10-CM

## 2020-08-12 DIAGNOSIS — O21.9 NAUSEA AND VOMITING DURING PREGNANCY: ICD-10-CM

## 2020-08-12 DIAGNOSIS — O99.019 IRON DEFICIENCY ANEMIA OF PREGNANCY: Primary | ICD-10-CM

## 2020-08-12 PROCEDURE — 96374 THER/PROPH/DIAG INJ IV PUSH: CPT

## 2020-08-12 RX ORDER — METHYLPREDNISOLONE SODIUM SUCCINATE 125 MG/2ML
125 INJECTION, POWDER, LYOPHILIZED, FOR SOLUTION INTRAMUSCULAR; INTRAVENOUS ONCE
Status: CANCELLED | OUTPATIENT
Start: 2020-08-14

## 2020-08-12 RX ORDER — DIPHENHYDRAMINE HYDROCHLORIDE 50 MG/ML
25 INJECTION INTRAMUSCULAR; INTRAVENOUS ONCE
Status: CANCELLED | OUTPATIENT
Start: 2020-08-14

## 2020-08-12 RX ORDER — METHYLPREDNISOLONE SODIUM SUCCINATE 40 MG/ML
40 INJECTION, POWDER, LYOPHILIZED, FOR SOLUTION INTRAMUSCULAR; INTRAVENOUS ONCE
Status: CANCELLED | OUTPATIENT
Start: 2020-08-14

## 2020-08-12 RX ORDER — FAMOTIDINE 10 MG/ML
20 INJECTION, SOLUTION INTRAVENOUS ONCE
Status: CANCELLED | OUTPATIENT
Start: 2020-08-14

## 2020-08-12 RX ORDER — MEPERIDINE HYDROCHLORIDE 25 MG/ML
25 INJECTION INTRAMUSCULAR; INTRAVENOUS; SUBCUTANEOUS ONCE
Status: CANCELLED | OUTPATIENT
Start: 2020-08-14

## 2020-08-12 NOTE — PROGRESS NOTES
Education Record    Learner:  Patient    Disease / Diagnosis: PAZ    Barriers / Limitations:  None   Comments:    Method:  Brief focused   Comments:    General Topics:  Plan of care reviewed   Comments:    Outcome:  Shows understanding   Comments:    Pt mo

## 2020-08-14 ENCOUNTER — APPOINTMENT (OUTPATIENT)
Dept: HEMATOLOGY/ONCOLOGY | Age: 30
End: 2020-08-14
Attending: OBSTETRICS & GYNECOLOGY
Payer: COMMERCIAL

## 2020-08-19 ENCOUNTER — OFFICE VISIT (OUTPATIENT)
Dept: HEMATOLOGY/ONCOLOGY | Age: 30
End: 2020-08-19
Attending: OBSTETRICS & GYNECOLOGY
Payer: COMMERCIAL

## 2020-08-19 VITALS
DIASTOLIC BLOOD PRESSURE: 67 MMHG | RESPIRATION RATE: 16 BRPM | SYSTOLIC BLOOD PRESSURE: 106 MMHG | OXYGEN SATURATION: 97 % | HEART RATE: 73 BPM | TEMPERATURE: 97 F

## 2020-08-19 DIAGNOSIS — E86.0 DEHYDRATION: ICD-10-CM

## 2020-08-19 DIAGNOSIS — D50.9 IRON DEFICIENCY ANEMIA OF PREGNANCY: Primary | ICD-10-CM

## 2020-08-19 DIAGNOSIS — O99.019 IRON DEFICIENCY ANEMIA OF PREGNANCY: Primary | ICD-10-CM

## 2020-08-19 DIAGNOSIS — O21.9 NAUSEA AND VOMITING DURING PREGNANCY: ICD-10-CM

## 2020-08-19 PROCEDURE — 96374 THER/PROPH/DIAG INJ IV PUSH: CPT

## 2020-08-19 RX ORDER — MEPERIDINE HYDROCHLORIDE 25 MG/ML
25 INJECTION INTRAMUSCULAR; INTRAVENOUS; SUBCUTANEOUS ONCE
Status: CANCELLED | OUTPATIENT
Start: 2020-08-19

## 2020-08-19 RX ORDER — FAMOTIDINE 10 MG/ML
20 INJECTION, SOLUTION INTRAVENOUS ONCE
Status: CANCELLED | OUTPATIENT
Start: 2020-08-19

## 2020-08-19 RX ORDER — METHYLPREDNISOLONE SODIUM SUCCINATE 125 MG/2ML
125 INJECTION, POWDER, LYOPHILIZED, FOR SOLUTION INTRAMUSCULAR; INTRAVENOUS ONCE
Status: CANCELLED | OUTPATIENT
Start: 2020-08-19

## 2020-08-19 RX ORDER — DIPHENHYDRAMINE HYDROCHLORIDE 50 MG/ML
25 INJECTION INTRAMUSCULAR; INTRAVENOUS ONCE
Status: CANCELLED | OUTPATIENT
Start: 2020-08-19

## 2020-08-19 RX ORDER — METHYLPREDNISOLONE SODIUM SUCCINATE 40 MG/ML
40 INJECTION, POWDER, LYOPHILIZED, FOR SOLUTION INTRAMUSCULAR; INTRAVENOUS ONCE
Status: CANCELLED | OUTPATIENT
Start: 2020-08-19

## 2023-10-05 NOTE — PROGRESS NOTES
EKG obtained at 17:56     Karina Leon  10/05/23 4564 Pt here for level 2 ultrasound on twin gestation  States + Fm  No complaints

## 2024-02-23 NOTE — PROGRESS NOTES
Pt given all discharge information and pt verbalized understanding to all. Pt is comfortable going home. Pt will call and schedule spencer with office. Pt will call MD with any questions.       Body mass index is 33.61 kg/m².    Physical Exam  Vitals and nursing note reviewed.   Constitutional:       Appearance: Normal appearance.   HENT:      Ears:      Comments: What appears to be a plastic tip to earbud or hearing aid lodged in the left ear canal  Neurological:      Mental Status: She is alert.         DIAGNOSTIC RESULTS     EKG: All EKG's are interpreted by the Emergency Department Physician who either signs or Co-signs this chart in the absence of a cardiologist.        RADIOLOGY:   Non-plain film images such as CT, Ultrasound and MRI are read by the radiologist. Plain radiographic images are visualized and preliminarily interpreted by the emergency physician with the below findings:        Interpretation per the Radiologist below, if available at the time of this note:    No orders to display        LABS:  Labs Reviewed - No data to display    All other labs were within normal range or not returned as of this dictation.    EMERGENCY DEPARTMENT COURSE and DIFFERENTIAL DIAGNOSIS/MDM:   Vitals:    Vitals:    02/23/24 1740 02/23/24 1741   BP:  (!) 181/79   Pulse:  76   Resp:  17   Temp: 97.9 °F (36.6 °C)    TempSrc: Oral    SpO2:  98%   Weight: 91.6 kg (202 lb)    Height: 1.651 m (5' 5\")            Medical Decision Making  Hemodynamically stable patient who arrives as above.  She has a single plastic/rubbery tip to a hearing aid in her left ear canal which was easily removed with a pair of alligator forceps.  On reevaluation of her ear, the ear canal and tympanic membrane are unremarkable.  Patient states she feels better.  Will discharge home            REASSESSMENT            CONSULTS:  None    PROCEDURES:  Unless otherwise noted below, none     Procedures      FINAL IMPRESSION      1. Foreign body of left ear, initial encounter          DISPOSITION/PLAN   DISPOSITION Decision To Discharge 02/23/2024 06:27:03 PM      PATIENT REFERRED TO:  Ailyn Pizarro, APRN - NP  [address]

## (undated) NOTE — ED AVS SNAPSHOT
Nicanor Primrose   MRN: [id number]    Department:  THE [facility] Emergency Department in [place]   Date of Visit:  1/16/2020           Disclosure     Insurance plans vary and the physician(s) referred by the ER may not be covered by your plan.  Please contact tell this physician (or your personal doctor if your instructions are to return to your personal doctor) about any new or lasting problems. The primary care or specialist physician will see patients referred from the [facility] Emergency Department.  Ngoc Odell

## (undated) NOTE — LETTER
Sheela Lew 182  [address] [facility] [address] [facility]  Authorization for Surgical Operation and Procedure     Date:___________                                                                                                         Time:__________ reactions, transmission of diseases such as Hepatitis, AIDS and Cytomegalovirus (CMV) and fluid overload. In the event that I wish to have an autologous transfusion of my own blood, or a directed donor transfusion. I will discuss this with my physician. ends for purposes of reinstating the DNAR order.   10. Patients having a sterilization procedure: I understand that if the procedure is successful the results will be permanent and it will therefore be impossible for me to inseminate, conceive, or bear chil using an “IV” to give memedicine, fluids or blood during my procedure, and having a breathing tube placed to help me breathe when I’m asleep (intubation).  In the event that my heart stops working properly, I understand that my anesthesiologist will make ev bleeding, infection, seizure, irregular heart rhythms, and nerve injury.     I can change my mind about having anesthesia services at any time before I get the medicine.    _____________________________________________________________________________  [facility]

## (undated) NOTE — ED AVS SNAPSHOT
Pinky Dooley   MRN: [id number]    Department:  [facility] Emergency Department   Date of Visit:  12/6/2019           Disclosure     Insurance plans vary and the physician(s) referred by the ER may not be covered by your plan.  Please contact your tell this physician (or your personal doctor if your instructions are to return to your personal doctor) about any new or lasting problems. The primary care or specialist physician will see patients referred from the [facility] Emergency Department.  Florin Baldwin

## (undated) NOTE — LETTER
Dear new mom:    We've missed you! The nurses of [facility] have tried to reach you by phone to ask if you had any questions regarding your health or the care of your new little one.     Please feel free to call your doctor with an

## (undated) NOTE — LETTER
20          RE:  Frances Koehler  :  1990      To Whom It [id number] Concern:    Frances Koehler  is currently under our care for pregnancy. It is permissible at her gestational age for dental work to be performed.   However, if x-rays are needed, ple